# Patient Record
Sex: MALE | Race: WHITE | NOT HISPANIC OR LATINO | Employment: OTHER | URBAN - METROPOLITAN AREA
[De-identification: names, ages, dates, MRNs, and addresses within clinical notes are randomized per-mention and may not be internally consistent; named-entity substitution may affect disease eponyms.]

---

## 2017-04-19 ENCOUNTER — OFFICE VISIT (OUTPATIENT)
Dept: CARDIOLOGY | Facility: MEDICAL CENTER | Age: 82
End: 2017-04-19
Payer: MEDICARE

## 2017-04-19 VITALS
HEART RATE: 89 BPM | BODY MASS INDEX: 23.86 KG/M2 | HEIGHT: 67 IN | OXYGEN SATURATION: 95 % | DIASTOLIC BLOOD PRESSURE: 64 MMHG | SYSTOLIC BLOOD PRESSURE: 130 MMHG | WEIGHT: 152 LBS

## 2017-04-19 DIAGNOSIS — J06.9 VIRAL UPPER RESPIRATORY TRACT INFECTION: ICD-10-CM

## 2017-04-19 DIAGNOSIS — J06.9 URI, ACUTE: ICD-10-CM

## 2017-04-19 PROCEDURE — G8432 DEP SCR NOT DOC, RNG: HCPCS | Performed by: INTERNAL MEDICINE

## 2017-04-19 PROCEDURE — 4040F PNEUMOC VAC/ADMIN/RCVD: CPT | Mod: 8P | Performed by: INTERNAL MEDICINE

## 2017-04-19 PROCEDURE — 1101F PT FALLS ASSESS-DOCD LE1/YR: CPT | Mod: 8P | Performed by: INTERNAL MEDICINE

## 2017-04-19 PROCEDURE — 93280 PM DEVICE PROGR EVAL DUAL: CPT | Performed by: INTERNAL MEDICINE

## 2017-04-19 PROCEDURE — 1036F TOBACCO NON-USER: CPT | Performed by: INTERNAL MEDICINE

## 2017-04-19 PROCEDURE — 99214 OFFICE O/P EST MOD 30 MIN: CPT | Mod: 25 | Performed by: INTERNAL MEDICINE

## 2017-04-19 PROCEDURE — G8420 CALC BMI NORM PARAMETERS: HCPCS | Performed by: INTERNAL MEDICINE

## 2017-04-19 RX ORDER — AZITHROMYCIN 250 MG/1
TABLET, FILM COATED ORAL
Qty: 5 TAB | Refills: 0 | Status: SHIPPED | OUTPATIENT
Start: 2017-04-19 | End: 2019-12-09

## 2017-04-19 NOTE — MR AVS SNAPSHOT
"        Abhilash CAMPOS Rios   2017 8:00 AM   Office Visit   MRN: [id number]    Department:  [facility]   Dept Phone:  [phone]    Description:  Male : 1929   Provider:  Durga Godinez M.D.           Reason for Visit     Follow-Up           Allergies as of 2017     Allergen Noted Reactions    Sulfa Drugs 2009   Anaphylaxis      You were diagnosed with     URI, acute   [999459]       Viral upper respiratory tract infection   [622759]         Vital Signs     Blood Pressure Pulse Height Weight Body Mass Index Oxygen Saturation    130/64 mmHg 89 1.702 m (5' 7.01\") 68.947 kg (152 lb) 23.80 kg/m2 95%    Smoking Status                   Never Smoker            Basic Information     Date Of Birth Sex Race Ethnicity Preferred Language    1929 Male White Non- English      Problem List              ICD-10-CM Priority Class Noted - Resolved    Atrial flutter (CMS-HCC) (Chronic) I48.92   3/8/2012 - Present    Dizziness (Chronic) R42   3/8/2012 - Present    Presence of permanent cardiac pacemaker (Chronic) Z95.0   3/8/2012 - Present    SSS (sick sinus syndrome) (CMS-[facility]) (Chronic) I49.5   3/8/2012 - Present    Vertigo (Chronic) R42   3/8/2012 - Present    Macular degeneration (Chronic) H35.30   3/8/2012 - Present    Pre-syncope (Chronic) R55   3/8/2012 - Present    TIA (transient ischemic attack) (Chronic) G45.9   3/8/2012 - Present    S/P ablation of atrial flutter Z98.890, Z86.79   3/27/2012 - Present    Abdominal hernia K46.9   3/27/2012 - Present    PAF (paroxysmal atrial fibrillation) (CMS-[facility]) I48.0   2013 - Present    Head ache R51   2013 - Present    URI (upper respiratory infection) J06.9   2014 - Present    H/O seasonal allergies Z88.9   2014 - Present    Cough R05   2016 - Present    Viral upper respiratory tract infection J06.9, B97.89   2017 - Present      Health Maintenance        Date Due Completion Dates    IMM DTaP/Tdap/Td Vaccine (1 - Tdap) " 7/24/1948 ---    COLONOSCOPY 7/24/1979 ---    IMM ZOSTER VACCINE 7/24/1989 ---    IMM PNEUMOCOCCAL 65+ (ADULT) LOW/MEDIUM RISK SERIES (1 of 2 - PCV13) 7/24/1994 ---            Current Immunizations     No immunizations on file.      Below and/or attached are the medications your provider expects you to take. Review all of your home medications and newly ordered medications with your provider and/or pharmacist. Follow medication instructions as directed by your provider and/or pharmacist. Please keep your medication list with you and share with your provider. Update the information when medications are discontinued, doses are changed, or new medications (including over-the-counter products) are added; and carry medication information at all times in the event of emergency situations     Allergies:  SULFA DRUGS - Anaphylaxis               Medications  Valid as of: April 19, 2017 -  8:10 AM    Generic Name Brand Name Tablet Size Instructions for use    Aspirin (Tablet Delayed Response) ECOTRIN 81 MG Take 81 mg by mouth every day.        Aspirin (Tab)  MG Take 325 mg by mouth every 6 hours as needed.        Azithromycin (Tab) ZITHROMAX 250 MG z pack take as directed        Cholecalciferol (Cap) Vitamin D 1000 UNIT Take 1 Cap by mouth every day.        .                 Medicines prescribed today were sent to:     [id number] - [place], [place] - [address] [address]    Phone: [phone] Fax: [phone]    Open 24 Hours?: No      Medication refill instructions:       If your prescription bottle indicates you have medication refills left, it is not necessary to call your provider’s office. Please contact your pharmacy and they will refill your medication.    If your prescription bottle indicates you do not have any refills left, you may request refills at any time through one of the following ways: The online [employer] system (except Urgent Care), by calling your provider’s  office, or by asking your pharmacy to contact your provider’s office with a refill request. Medication refills are processed only during regular business hours and may not be available until the next business day. Your provider may request additional information or to have a follow-up visit with you prior to refilling your medication.   *Please Note: Medication refills are assigned a new Rx number when refilled electronically. Your pharmacy may indicate that no refills were authorized even though a new prescription for the same medication is available at the pharmacy. Please request the medicine by name with the pharmacy before contacting your provider for a refill.           "[employer] Access Code: [email or web address]  Expires: 5/19/2017  8:10 AM    "[employer]  A secure, online tool to manage your health information     [employer]’s "[employer]® is a secure, online tool that connects you to your personalized health information from the privacy of your home -- day or night - making it very easy for you to manage your healthcare. Once the activation process is completed, you can even access your medical information using the "[employer] kelsey, which is available for free in the Apple Kelsey store or Google Play store.     "[employer] provides the following levels of access (as shown below):   My Chart Features   [facility] Doctor [facility]  Urgent  Care Non-[facility]  Primary Care  Doctor   Email your healthcare team securely and privately 24/7 X X X    Manage appointments: schedule your next appointment; view details of past/upcoming appointments X      Request prescription refills. X      View recent personal medical records, including lab and immunizations X X X X   View health record, including health history, allergies, medications X X X X   Read reports about your outpatient visits, procedures, consult and ER notes X X X X   See your discharge summary, which is a recap of your hospital and/or ER visit that  includes your diagnosis, lab results, and care plan. X X       How to register for [employer]:  1. Go to  [email or web address].  2. Click on the Sign Up Now box, which takes you to the New Member Sign Up page. You will need to provide the following information:  a. Enter your [employer] Access Code exactly as it appears at the top of this page. (You will not need to use this code after you’ve completed the sign-up process. If you do not sign up before the expiration date, you must request a new code.)   b. Enter your date of birth.   c. Enter your home email address.   d. Click Submit, and follow the next screen’s instructions.  3. Create a [employer] ID. This will be your [employer] login ID and cannot be changed, so think of one that is secure and easy to remember.  4. Create a [employer] password. You can change your password at any time.  5. Enter your Password Reset Question and Answer. This can be used at a later time if you forget your password.   6. Enter your e-mail address. This allows you to receive e-mail notifications when new information is available in [employer].  7. Click Sign Up. You can now view your health information.    For assistance activating your [employer] account, call [phone]

## 2017-04-19 NOTE — PROGRESS NOTES
"Subjective:   Abhilash Rios is a 87 y.o. male who presents today with previous flutter RFA. PPM for SSS. Vertigo. Two month h/o cough non productive and not improving. Mild chronic SOB. No chest pain.     Past Medical History   Diagnosis Date   • Atrial flutter (CMS-HCC) 3/8/2012   • S/P AV amy ablation 3/8/2012   • Dizziness 3/8/2012   • Presence of permanent cardiac pacemaker 3/8/2012   • SSS (sick sinus syndrome) (CMS-[facility]) 3/8/2012   • Vertigo 3/8/2012   • Macular degeneration 3/8/2012   • Pre-syncope 3/8/2012   • TIA (transient ischemic attack) 3/8/2012     Past Surgical History   Procedure Laterality Date   • Eye surgery       09/02/09 Status post eye surgery for macular degeneration.   • Other orthopedic surgery       Lower Back Surgery   • Rotator cuff repair       09/02/09 Bilateral.   • Trans urethral resection prostate       Family History   Problem Relation Age of Onset   • Non-contributory Other      History   Smoking status   • Never Smoker    Smokeless tobacco   • Never Used     Allergies   Allergen Reactions   • Sulfa Drugs Anaphylaxis     Outpatient Encounter Prescriptions as of 4/19/2017   Medication Sig Dispense Refill   • azithromycin (ZITHROMAX) 250 MG Tab z pack take as directed 5 Tab 0   • aspirin (ASA) 325 MG Tab Take 325 mg by mouth every 6 hours as needed.     • Cholecalciferol (VITAMIN D) 1000 UNIT CAPS Take 1 Cap by mouth every day.     • aspirin EC (ECOTRIN) 81 MG Tablet Delayed Response Take 81 mg by mouth every day.       No facility-administered encounter medications on file as of 4/19/2017.     ROS     Objective:   /64 mmHg  Pulse 89  Ht 1.702 m (5' 7.01\")  Wt 68.947 kg (152 lb)  BMI 23.80 kg/m2  SpO2 95%    Physical Exam   Constitutional: He is oriented to person, place, and time. He appears well-developed and well-nourished.   HENT:   Mouth/Throat: Oropharynx is clear and moist.   Eyes: Conjunctivae and EOM are normal.   Neck: No JVD present. No thyroid mass " present.   Cardiovascular: Normal rate, regular rhythm, S1 normal, S2 normal and normal pulses.  PMI is not displaced.  Exam reveals no gallop.    No murmur heard.  Pulses:       Carotid pulses are 2+ on the right side, and 2+ on the left side.       Radial pulses are 2+ on the right side, and 2+ on the left side.        Femoral pulses are 2+ on the right side, and 2+ on the left side.       Dorsalis pedis pulses are 2+ on the right side, and 2+ on the left side.   No peripheral edema.   Pulmonary/Chest: Effort normal and breath sounds normal.   Abdominal: Soft. Normal appearance. He exhibits no abdominal bruit. There is no hepatosplenomegaly. There is no tenderness.   Musculoskeletal: Normal range of motion. He exhibits no edema.        Thoracic back: He exhibits no tenderness and no spasm.   Neurological: He is alert and oriented to person, place, and time.   Skin: No rash noted. No cyanosis. Nails show no clubbing.   Psychiatric: He has a normal mood and affect.       Assessment:     1. URI, acute  azithromycin (ZITHROMAX) 250 MG Tab   2. Viral upper respiratory tract infection         Medical Decision Making:  Today's Assessment / Status / Plan:   1. Flutter post RFA no recurrence.  2. SSS nl PPM function.  3. URI try a zpak. If not improved he will call for a chest xray.  4. Vertigo unchanged.  5. F/U in 6 months.

## 2017-04-19 NOTE — Clinical Note
"     [facility]   [address], [address]  [place] [address]  Phone: [phone]  Fax: [phone]              Abhilash Rios  7/24/1929    Encounter Date: 4/19/2017    Durga Godinez M.D.          PROGRESS NOTE:  Subjective:   Abhilash Rios is a 87 y.o. male who presents today with previous flutter RFA. PPM for SSS. Vertigo. Two month h/o cough non productive and not improving. Mild chronic SOB. No chest pain.     Past Medical History   Diagnosis Date   • Atrial flutter (CMS-[facility]) 3/8/2012   • S/P AV amy ablation 3/8/2012   • Dizziness 3/8/2012   • Presence of permanent cardiac pacemaker 3/8/2012   • SSS (sick sinus syndrome) (CMS-[facility]) 3/8/2012   • Vertigo 3/8/2012   • Macular degeneration 3/8/2012   • Pre-syncope 3/8/2012   • TIA (transient ischemic attack) 3/8/2012     Past Surgical History   Procedure Laterality Date   • Eye surgery       09/02/09 Status post eye surgery for macular degeneration.   • Other orthopedic surgery       Lower Back Surgery   • Rotator cuff repair       09/02/09 Bilateral.   • Trans urethral resection prostate       Family History   Problem Relation Age of Onset   • Non-contributory Other      History   Smoking status   • Never Smoker    Smokeless tobacco   • Never Used     Allergies   Allergen Reactions   • Sulfa Drugs Anaphylaxis     Outpatient Encounter Prescriptions as of 4/19/2017   Medication Sig Dispense Refill   • azithromycin (ZITHROMAX) 250 MG Tab z pack take as directed 5 Tab 0   • aspirin (ASA) 325 MG Tab Take 325 mg by mouth every 6 hours as needed.     • Cholecalciferol (VITAMIN D) 1000 UNIT CAPS Take 1 Cap by mouth every day.     • aspirin EC (ECOTRIN) 81 MG Tablet Delayed Response Take 81 mg by mouth every day.       No facility-administered encounter medications on file as of 4/19/2017.     ROS     Objective:   /64 mmHg  Pulse 89  Ht 1.702 m (5' 7.01\")  Wt 68.947 kg (152 lb)  BMI 23.80 kg/m2  SpO2 " 95%    Physical Exam   Constitutional: He is oriented to person, place, and time. He appears well-developed and well-nourished.   HENT:   Mouth/Throat: Oropharynx is clear and moist.   Eyes: Conjunctivae and EOM are normal.   Neck: No JVD present. No thyroid mass present.   Cardiovascular: Normal rate, regular rhythm, S1 normal, S2 normal and normal pulses.  PMI is not displaced.  Exam reveals no gallop.    No murmur heard.  Pulses:       Carotid pulses are 2+ on the right side, and 2+ on the left side.       Radial pulses are 2+ on the right side, and 2+ on the left side.        Femoral pulses are 2+ on the right side, and 2+ on the left side.       Dorsalis pedis pulses are 2+ on the right side, and 2+ on the left side.   No peripheral edema.   Pulmonary/Chest: Effort normal and breath sounds normal.   Abdominal: Soft. Normal appearance. He exhibits no abdominal bruit. There is no hepatosplenomegaly. There is no tenderness.   Musculoskeletal: Normal range of motion. He exhibits no edema.        Thoracic back: He exhibits no tenderness and no spasm.   Neurological: He is alert and oriented to person, place, and time.   Skin: No rash noted. No cyanosis. Nails show no clubbing.   Psychiatric: He has a normal mood and affect.       Assessment:     1. URI, acute  azithromycin (ZITHROMAX) 250 MG Tab   2. Viral upper respiratory tract infection         Medical Decision Making:  Today's Assessment / Status / Plan:   1. Flutter post RFA no recurrence.  2. SSS nl PPM function.  3. URI try a zpak. If not improved he will call for a chest xray.  4. Vertigo unchanged.  5. F/U in 6 months.        James Avila M.D.  [address] [place] [id number]  VIA Facsimile: [phone]

## 2017-10-23 ENCOUNTER — OFFICE VISIT (OUTPATIENT)
Dept: CARDIOLOGY | Facility: MEDICAL CENTER | Age: 82
End: 2017-10-23
Payer: MEDICARE

## 2017-10-23 VITALS
DIASTOLIC BLOOD PRESSURE: 70 MMHG | HEART RATE: 74 BPM | BODY MASS INDEX: 23.86 KG/M2 | OXYGEN SATURATION: 96 % | SYSTOLIC BLOOD PRESSURE: 100 MMHG | HEIGHT: 67 IN | WEIGHT: 152 LBS

## 2017-10-23 DIAGNOSIS — Z95.0 PRESENCE OF PERMANENT CARDIAC PACEMAKER: Chronic | ICD-10-CM

## 2017-10-23 DIAGNOSIS — I49.5 SSS (SICK SINUS SYNDROME) (HCC): Chronic | ICD-10-CM

## 2017-10-23 DIAGNOSIS — I48.3 TYPICAL ATRIAL FLUTTER (HCC): Chronic | ICD-10-CM

## 2017-10-23 DIAGNOSIS — G45.8 OTHER SPECIFIED TRANSIENT CEREBRAL ISCHEMIAS: Chronic | ICD-10-CM

## 2017-10-23 DIAGNOSIS — Z98.890 S/P ABLATION OF ATRIAL FLUTTER: ICD-10-CM

## 2017-10-23 DIAGNOSIS — Z86.79 S/P ABLATION OF ATRIAL FLUTTER: ICD-10-CM

## 2017-10-23 PROBLEM — J06.9 VIRAL UPPER RESPIRATORY TRACT INFECTION: Status: RESOLVED | Noted: 2017-04-19 | Resolved: 2017-10-23

## 2017-10-23 PROCEDURE — 99214 OFFICE O/P EST MOD 30 MIN: CPT | Mod: 25 | Performed by: INTERNAL MEDICINE

## 2017-10-23 PROCEDURE — 93280 PM DEVICE PROGR EVAL DUAL: CPT | Performed by: INTERNAL MEDICINE

## 2017-10-23 NOTE — LETTER
[facility]   [address], [address]  [place] [address]  Phone: [phone]  Fax: [phone]              Abhilash Rios  7/24/1929    Encounter Date: 10/23/2017    Durga Godinez M.D.          PROGRESS NOTE:  Subjective:   Abhilash Rios is a 88 y.o. male who presents today with previous flutter and RFA. PPM for SSS. Meets criteria for anticoagulation but he does not want. No new complaints. No syncope or presyncope. No chest pain. Stable. No palps.     Past Medical History:   Diagnosis Date   • Atrial flutter (CMS-HCC) 3/8/2012   • Dizziness 3/8/2012   • Macular degeneration 3/8/2012   • Pre-syncope 3/8/2012   • Presence of permanent cardiac pacemaker 3/8/2012   • S/P AV amy ablation 3/8/2012   • SSS (sick sinus syndrome) (CMS-HCC) 3/8/2012   • TIA (transient ischemic attack) 3/8/2012   • Vertigo 3/8/2012     Past Surgical History:   Procedure Laterality Date   • EYE SURGERY      09/02/09 Status post eye surgery for macular degeneration.   • OTHER ORTHOPEDIC SURGERY      Lower Back Surgery   • ROTATOR CUFF REPAIR      09/02/09 Bilateral.   • TRANS URETHRAL RESECTION PROSTATE       Family History   Problem Relation Age of Onset   • Non-contributory Other      History   Smoking Status   • Never Smoker   Smokeless Tobacco   • Never Used     Allergies   Allergen Reactions   • Sulfa Drugs Anaphylaxis     Outpatient Encounter Prescriptions as of 10/23/2017   Medication Sig Dispense Refill   • aspirin (ASA) 325 MG Tab Take 325 mg by mouth every 6 hours as needed.     • Cholecalciferol (VITAMIN D) 1000 UNIT CAPS Take 1 Cap by mouth every day.     • azithromycin (ZITHROMAX) 250 MG Tab z pack take as directed (Patient not taking: Reported on 10/23/2017) 5 Tab 0   • aspirin EC (ECOTRIN) 81 MG Tablet Delayed Response Take 81 mg by mouth every day.       No facility-administered encounter medications on file as of 10/23/2017.      ROS     Objective:   /70    "Pulse 74   Ht 1.702 m (5' 7.01\")   Wt 68.9 kg (152 lb)   SpO2 96%   BMI 23.80 kg/m²      Physical Exam   Constitutional: He is oriented to person, place, and time. He appears well-developed. No distress.   HENT:   Mouth/Throat: Mucous membranes are normal.   Eyes: Conjunctivae and EOM are normal.   Neck: No JVD present. No tracheal deviation present. No thyroid mass and no thyromegaly present.   Cardiovascular: Normal rate, regular rhythm and intact distal pulses.    No murmur heard.  Pulmonary/Chest: Effort normal and breath sounds normal. No respiratory distress. He exhibits no tenderness.   Abdominal: Soft. There is no tenderness.   Musculoskeletal: Normal range of motion. He exhibits no edema.   Neurological: He is alert and oriented to person, place, and time. He has normal strength. He displays no tremor.   Skin: Skin is warm and dry. He is not diaphoretic.   Psychiatric: He has a normal mood and affect. His behavior is normal.   Vitals reviewed.      Assessment:     1. Typical atrial flutter (CMS-HCC)     2. Other specified transient cerebral ischemias     3. SSS (sick sinus syndrome) (CMS-HCC)     4. S/P ablation of atrial flutter     5. Presence of permanent cardiac pacemaker         Medical Decision Making:  Today's Assessment / Status / Plan:   1. SSS with PPM stable.  2. Atrial flutter post RFA. No recurrence.  3. PAF as above.  4. F/U in 6 months.      James Avila M.D.  [address] [place] [address]  VIA Facsimile: [phone]                 "

## 2018-05-15 ENCOUNTER — OFFICE VISIT (OUTPATIENT)
Dept: CARDIOLOGY | Facility: MEDICAL CENTER | Age: 83
End: 2018-05-15
Payer: MEDICARE

## 2018-05-15 VITALS
HEIGHT: 67 IN | SYSTOLIC BLOOD PRESSURE: 140 MMHG | BODY MASS INDEX: 25.27 KG/M2 | OXYGEN SATURATION: 100 % | HEART RATE: 87 BPM | WEIGHT: 161 LBS | DIASTOLIC BLOOD PRESSURE: 82 MMHG

## 2018-05-15 DIAGNOSIS — I48.3 TYPICAL ATRIAL FLUTTER (HCC): Chronic | ICD-10-CM

## 2018-05-15 DIAGNOSIS — Z98.890 S/P ABLATION OF ATRIAL FLUTTER: ICD-10-CM

## 2018-05-15 DIAGNOSIS — R42 VERTIGO: Chronic | ICD-10-CM

## 2018-05-15 DIAGNOSIS — I49.5 SSS (SICK SINUS SYNDROME) (HCC): Chronic | ICD-10-CM

## 2018-05-15 DIAGNOSIS — Z95.0 PRESENCE OF PERMANENT CARDIAC PACEMAKER: Chronic | ICD-10-CM

## 2018-05-15 DIAGNOSIS — Z86.79 S/P ABLATION OF ATRIAL FLUTTER: ICD-10-CM

## 2018-05-15 PROCEDURE — 93280 PM DEVICE PROGR EVAL DUAL: CPT | Performed by: INTERNAL MEDICINE

## 2018-05-15 PROCEDURE — 99213 OFFICE O/P EST LOW 20 MIN: CPT | Performed by: INTERNAL MEDICINE

## 2018-05-15 NOTE — LETTER
[facility]   [address]  [place] [address]  Phone: [phone]  Fax: [phone]              Abhilash Rios  7/24/1929    Encounter Date: 5/15/2018    Durga Godinez M.D.          PROGRESS NOTE:  Chief Complaint   Patient presents with   • Atrial Flutter     F/V DX:ATRIAL FLUTTER       Subjective:   Abhilash Rios is a 88 y.o. male who presents today with SSS and PPM. Doing well. No chest pain or SOB. Minimal atrial arrhythmias. On ASA per patients request. Overall stable.    Past Medical History:   Diagnosis Date   • Atrial flutter (HCC) 3/8/2012   • Dizziness 3/8/2012   • Macular degeneration 3/8/2012   • Pre-syncope 3/8/2012   • Presence of permanent cardiac pacemaker 3/8/2012   • S/P AV amy ablation 3/8/2012   • SSS (sick sinus syndrome) (HCC) 3/8/2012   • TIA (transient ischemic attack) 3/8/2012   • Vertigo 3/8/2012     Past Surgical History:   Procedure Laterality Date   • EYE SURGERY      09/02/09 Status post eye surgery for macular degeneration.   • OTHER ORTHOPEDIC SURGERY      Lower Back Surgery   • ROTATOR CUFF REPAIR      09/02/09 Bilateral.   • TRANS URETHRAL RESECTION PROSTATE       Family History   Problem Relation Age of Onset   • Non-contributory Other      Social History     Social History   • Marital status: Single     Spouse name: N/A   • Number of children: N/A   • Years of education: N/A     Occupational History   • Not on file.     Social History Main Topics   • Smoking status: Never Smoker   • Smokeless tobacco: Never Used   • Alcohol use Not on file   • Drug use: Unknown   • Sexual activity: Not on file     Other Topics Concern   • Not on file     Social History Narrative   • No narrative on file     Allergies   Allergen Reactions   • Sulfa Drugs Anaphylaxis     Outpatient Encounter Prescriptions as of 5/15/2018   Medication Sig Dispense Refill   • aspirin (ASA) 325 MG Tab Take 325 mg by mouth every 6 hours as needed.    "  • Cholecalciferol (VITAMIN D) 1000 UNIT CAPS Take 1 Cap by mouth every day.     • azithromycin (ZITHROMAX) 250 MG Tab z pack take as directed (Patient not taking: Reported on 10/23/2017) 5 Tab 0   • aspirin EC (ECOTRIN) 81 MG Tablet Delayed Response Take 81 mg by mouth every day.       No facility-administered encounter medications on file as of 5/15/2018.      ROS     Objective:   /82   Pulse 87   Ht 1.702 m (5' 7.01\")   Wt 73 kg (161 lb)   SpO2 100%   BMI 25.21 kg/m²      Physical Exam   Constitutional: He is oriented to person, place, and time. He appears well-developed and well-nourished.   HENT:   Head: Normocephalic and atraumatic.   Eyes: EOM are normal.   Neck: Normal range of motion. Neck supple.   Cardiovascular: Normal rate, regular rhythm and intact distal pulses.  Exam reveals no gallop and no friction rub.    No murmur heard.  Pulmonary/Chest: Effort normal and breath sounds normal.   Abdominal: Soft.   Musculoskeletal: Normal range of motion. He exhibits no edema.   Neurological: He is alert and oriented to person, place, and time.   Skin: Skin is warm and dry.   Psychiatric: He has a normal mood and affect. His behavior is normal. Judgment and thought content normal.       Assessment:     1. Presence of permanent cardiac pacemaker     2. Typical atrial flutter (HCC)     3. S/P ablation of atrial flutter     4. Vertigo     5. SSS (sick sinus syndrome) (HCC)         Medical Decision Making:  Today's Assessment / Status / Plan:   1. SSS nl PPM function.  2. PAF minimal.  3. Vertigo unchanged.  4. F/U device check in 6 months and me in 1 year.      James Avila M.D.  [address] [place] [address]  VIA Facsimile: [phone]                 "

## 2019-05-22 ENCOUNTER — OFFICE VISIT (OUTPATIENT)
Dept: CARDIOLOGY | Facility: MEDICAL CENTER | Age: 84
End: 2019-05-22
Payer: COMMERCIAL

## 2019-05-22 VITALS
BODY MASS INDEX: 21.66 KG/M2 | OXYGEN SATURATION: 97 % | SYSTOLIC BLOOD PRESSURE: 144 MMHG | HEART RATE: 98 BPM | DIASTOLIC BLOOD PRESSURE: 92 MMHG | WEIGHT: 138 LBS | HEIGHT: 67 IN

## 2019-05-22 DIAGNOSIS — Z86.79 S/P ABLATION OF ATRIAL FLUTTER: ICD-10-CM

## 2019-05-22 DIAGNOSIS — I48.0 PAF (PAROXYSMAL ATRIAL FIBRILLATION) (HCC): ICD-10-CM

## 2019-05-22 DIAGNOSIS — Z98.890 S/P ABLATION OF ATRIAL FLUTTER: ICD-10-CM

## 2019-05-22 DIAGNOSIS — I48.3 TYPICAL ATRIAL FLUTTER (HCC): Chronic | ICD-10-CM

## 2019-05-22 DIAGNOSIS — I49.5 SSS (SICK SINUS SYNDROME) (HCC): Chronic | ICD-10-CM

## 2019-05-22 DIAGNOSIS — Z95.0 PRESENCE OF PERMANENT CARDIAC PACEMAKER: Chronic | ICD-10-CM

## 2019-05-22 PROCEDURE — 93280 PM DEVICE PROGR EVAL DUAL: CPT | Performed by: INTERNAL MEDICINE

## 2019-05-22 PROCEDURE — 99214 OFFICE O/P EST MOD 30 MIN: CPT | Mod: 25 | Performed by: INTERNAL MEDICINE

## 2019-05-22 NOTE — PROGRESS NOTES
Chief Complaint   Patient presents with   • Atrial Flutter     FV       Subjective:   Abhilash Rios is a 89 y.o. male who presents today with previous atrial flutter status post ablation status post permanent pacemaker for sick sinus syndrome no real atrial fibrillation.  Patient has not wanted to be on anticoagulation.  Denies any chest pain or shortness of breath recently of the flu.  Today is accompanied by his brother.    Past Medical History:   Diagnosis Date   • Atrial flutter (HCC) 3/8/2012   • Dizziness 3/8/2012   • Macular degeneration 3/8/2012   • Pre-syncope 3/8/2012   • Presence of permanent cardiac pacemaker 3/8/2012   • S/P AV amy ablation 3/8/2012   • SSS (sick sinus syndrome) (HCC) 3/8/2012   • TIA (transient ischemic attack) 3/8/2012   • Vertigo 3/8/2012     Past Surgical History:   Procedure Laterality Date   • EYE SURGERY      09/02/09 Status post eye surgery for macular degeneration.   • OTHER ORTHOPEDIC SURGERY      Lower Back Surgery   • ROTATOR CUFF REPAIR      09/02/09 Bilateral.   • TRANS URETHRAL RESECTION PROSTATE       Family History   Problem Relation Age of Onset   • Non-contributory Other      Social History     Social History   • Marital status: Single     Spouse name: N/A   • Number of children: N/A   • Years of education: N/A     Occupational History   • Not on file.     Social History Main Topics   • Smoking status: Never Smoker   • Smokeless tobacco: Never Used   • Alcohol use No   • Drug use: No   • Sexual activity: Not on file     Other Topics Concern   • Not on file     Social History Narrative   • No narrative on file     Allergies   Allergen Reactions   • Sulfa Drugs Anaphylaxis     Outpatient Encounter Prescriptions as of 5/22/2019   Medication Sig Dispense Refill   • aspirin (ASA) 325 MG Tab Take 325 mg by mouth every 6 hours as needed.     • Cholecalciferol (VITAMIN D) 1000 UNIT CAPS Take 1 Cap by mouth every day.     • azithromycin (ZITHROMAX) 250 MG Tab z pack take  "as directed (Patient not taking: Reported on 10/23/2017) 5 Tab 0   • aspirin EC (ECOTRIN) 81 MG Tablet Delayed Response Take 81 mg by mouth every day.       No facility-administered encounter medications on file as of 5/22/2019.      ROS     Objective:   /92 (BP Location: Left arm, Patient Position: Sitting, BP Cuff Size: Adult)   Pulse 98   Ht 1.702 m (5' 7.01\")   Wt 62.6 kg (138 lb)   SpO2 97%   BMI 21.61 kg/m²     Physical Exam   Constitutional: He is oriented to person, place, and time. He appears well-developed and well-nourished.   HENT:   Head: Normocephalic and atraumatic.   Eyes: EOM are normal.   Neck: Normal range of motion. Neck supple.   Cardiovascular: Normal rate, regular rhythm and intact distal pulses.  Exam reveals no gallop and no friction rub.    No murmur heard.  Pulmonary/Chest: Effort normal and breath sounds normal.   Abdominal: Soft.   Musculoskeletal: Normal range of motion. He exhibits no edema.   Neurological: He is alert and oriented to person, place, and time.   Skin: Skin is warm and dry.   Psychiatric: He has a normal mood and affect. His behavior is normal. Judgment and thought content normal.       Assessment:     1. S/P ablation of atrial flutter     2. PAF (paroxysmal atrial fibrillation) ([facility])     3. Typical atrial flutter ([facility])     4. SSS (sick sinus syndrome) ([facility])     5. Presence of permanent cardiac pacemaker         Medical Decision Making:  Today's Assessment / Status / Plan:   1.  Sick sinus syndrome permanent pacemaker normal function nearing DEMARCO recheck in 6 months.  2.  Paroxysmal atrial fibrillation none seen.  3.  Atrial flutter status post ablation.    "

## 2019-05-22 NOTE — LETTER
[facility]   [address], [address]  [place] [address]  Phone: [phone]  Fax: [phone]              Abhilash Rios  7/24/1929    Encounter Date: 5/22/2019    Durga Godinez M.D.          PROGRESS NOTE:  Chief Complaint   Patient presents with   • Atrial Flutter     FV       Subjective:   Abhilash Rios is a 89 y.o. male who presents today with previous atrial flutter status post ablation status post permanent pacemaker for sick sinus syndrome no real atrial fibrillation.  Patient has not wanted to be on anticoagulation.  Denies any chest pain or shortness of breath recently of the flu.  Today is accompanied by his brother.    Past Medical History:   Diagnosis Date   • Atrial flutter (HCC) 3/8/2012   • Dizziness 3/8/2012   • Macular degeneration 3/8/2012   • Pre-syncope 3/8/2012   • Presence of permanent cardiac pacemaker 3/8/2012   • S/P AV amy ablation 3/8/2012   • SSS (sick sinus syndrome) (HCC) 3/8/2012   • TIA (transient ischemic attack) 3/8/2012   • Vertigo 3/8/2012     Past Surgical History:   Procedure Laterality Date   • EYE SURGERY      09/02/09 Status post eye surgery for macular degeneration.   • OTHER ORTHOPEDIC SURGERY      Lower Back Surgery   • ROTATOR CUFF REPAIR      09/02/09 Bilateral.   • TRANS URETHRAL RESECTION PROSTATE       Family History   Problem Relation Age of Onset   • Non-contributory Other      Social History     Social History   • Marital status: Single     Spouse name: N/A   • Number of children: N/A   • Years of education: N/A     Occupational History   • Not on file.     Social History Main Topics   • Smoking status: Never Smoker   • Smokeless tobacco: Never Used   • Alcohol use No   • Drug use: No   • Sexual activity: Not on file     Other Topics Concern   • Not on file     Social History Narrative   • No narrative on file     Allergies   Allergen Reactions   • Sulfa Drugs Anaphylaxis     Outpatient Encounter  "Prescriptions as of 5/22/2019   Medication Sig Dispense Refill   • aspirin (ASA) 325 MG Tab Take 325 mg by mouth every 6 hours as needed.     • Cholecalciferol (VITAMIN D) 1000 UNIT CAPS Take 1 Cap by mouth every day.     • azithromycin (ZITHROMAX) 250 MG Tab z pack take as directed (Patient not taking: Reported on 10/23/2017) 5 Tab 0   • aspirin EC (ECOTRIN) 81 MG Tablet Delayed Response Take 81 mg by mouth every day.       No facility-administered encounter medications on file as of 5/22/2019.      ROS     Objective:   /92 (BP Location: Left arm, Patient Position: Sitting, BP Cuff Size: Adult)   Pulse 98   Ht 1.702 m (5' 7.01\")   Wt 62.6 kg (138 lb)   SpO2 97%   BMI 21.61 kg/m²      Physical Exam   Constitutional: He is oriented to person, place, and time. He appears well-developed and well-nourished.   HENT:   Head: Normocephalic and atraumatic.   Eyes: EOM are normal.   Neck: Normal range of motion. Neck supple.   Cardiovascular: Normal rate, regular rhythm and intact distal pulses.  Exam reveals no gallop and no friction rub.    No murmur heard.  Pulmonary/Chest: Effort normal and breath sounds normal.   Abdominal: Soft.   Musculoskeletal: Normal range of motion. He exhibits no edema.   Neurological: He is alert and oriented to person, place, and time.   Skin: Skin is warm and dry.   Psychiatric: He has a normal mood and affect. His behavior is normal. Judgment and thought content normal.       Assessment:     1. S/P ablation of atrial flutter     2. PAF (paroxysmal atrial fibrillation) ([facility])     3. Typical atrial flutter ([facility])     4. SSS (sick sinus syndrome) ([facility])     5. Presence of permanent cardiac pacemaker         Medical Decision Making:  Today's Assessment / Status / Plan:   1.  Sick sinus syndrome permanent pacemaker normal function nearing DEMARCO recheck in 6 months.  2.  Paroxysmal atrial fibrillation none seen.  3.  Atrial flutter status post ablation.        James Avila M.D.  [address] [place] [id number]  VIA Facsimile: [phone]

## 2019-11-20 ENCOUNTER — NON-PROVIDER VISIT (OUTPATIENT)
Dept: CARDIOLOGY | Facility: MEDICAL CENTER | Age: 84
End: 2019-11-20
Payer: COMMERCIAL

## 2019-11-20 ENCOUNTER — TELEPHONE (OUTPATIENT)
Dept: CARDIOLOGY | Facility: MEDICAL CENTER | Age: 84
End: 2019-11-20

## 2019-11-20 DIAGNOSIS — I49.5 SSS (SICK SINUS SYNDROME) (HCC): Chronic | ICD-10-CM

## 2019-11-20 DIAGNOSIS — Z95.0 PRESENCE OF PERMANENT CARDIAC PACEMAKER: Chronic | ICD-10-CM

## 2019-11-20 PROCEDURE — 93279 PRGRMG DEV EVAL PM/LDLS PM: CPT | Performed by: INTERNAL MEDICINE

## 2019-11-20 NOTE — TELEPHONE ENCOUNTER
Patient seen in device clinic--functioning appropriately.  Patient in atrial fib/atrial flutter since 6/30--confirms he is taking 325 aspirin.  Patient states that fatigue and SOB has steadily increased.

## 2019-11-20 NOTE — TELEPHONE ENCOUNTER
Should start eliquis 2.5 mg bid and dc asa and f/u with us in next few weeks. He may refuse anticoagulation

## 2019-11-20 NOTE — TELEPHONE ENCOUNTER
Spoke to pt and advised. He verbalized understanding to of all and to stop ASA when starts new med, appt 12/9 DS. rx sent

## 2019-12-09 ENCOUNTER — OFFICE VISIT (OUTPATIENT)
Dept: CARDIOLOGY | Facility: MEDICAL CENTER | Age: 84
End: 2019-12-09
Payer: COMMERCIAL

## 2019-12-09 ENCOUNTER — TELEPHONE (OUTPATIENT)
Dept: CARDIOLOGY | Facility: MEDICAL CENTER | Age: 84
End: 2019-12-09

## 2019-12-09 VITALS
HEIGHT: 67 IN | HEART RATE: 89 BPM | BODY MASS INDEX: 21.82 KG/M2 | OXYGEN SATURATION: 96 % | DIASTOLIC BLOOD PRESSURE: 74 MMHG | SYSTOLIC BLOOD PRESSURE: 124 MMHG | WEIGHT: 139 LBS

## 2019-12-09 DIAGNOSIS — Z98.890 S/P ABLATION OF ATRIAL FLUTTER: ICD-10-CM

## 2019-12-09 DIAGNOSIS — G45.9 TIA (TRANSIENT ISCHEMIC ATTACK): Chronic | ICD-10-CM

## 2019-12-09 DIAGNOSIS — Z95.0 PRESENCE OF PERMANENT CARDIAC PACEMAKER: Chronic | ICD-10-CM

## 2019-12-09 DIAGNOSIS — I48.0 PAF (PAROXYSMAL ATRIAL FIBRILLATION) (HCC): ICD-10-CM

## 2019-12-09 DIAGNOSIS — I48.4 ATYPICAL ATRIAL FLUTTER (HCC): ICD-10-CM

## 2019-12-09 DIAGNOSIS — Z86.79 S/P ABLATION OF ATRIAL FLUTTER: ICD-10-CM

## 2019-12-09 DIAGNOSIS — Z45.010 ELECTIVE REPLACEMENT INDICATED FOR CARDIAC PACEMAKER BATTERY AT END OF LIFESPAN: ICD-10-CM

## 2019-12-09 DIAGNOSIS — I49.5 SSS (SICK SINUS SYNDROME) (HCC): Chronic | ICD-10-CM

## 2019-12-09 LAB — EKG IMPRESSION: NORMAL

## 2019-12-09 PROCEDURE — 93000 ELECTROCARDIOGRAM COMPLETE: CPT | Mod: 59 | Performed by: INTERNAL MEDICINE

## 2019-12-09 PROCEDURE — 93279 PRGRMG DEV EVAL PM/LDLS PM: CPT | Performed by: INTERNAL MEDICINE

## 2019-12-09 PROCEDURE — 99215 OFFICE O/P EST HI 40 MIN: CPT | Mod: 25 | Performed by: INTERNAL MEDICINE

## 2019-12-09 RX ORDER — ASPIRIN 325 MG
325 TABLET ORAL
COMMUNITY
End: 2019-12-09

## 2019-12-09 NOTE — TELEPHONE ENCOUNTER
Patient scheduled for flutter ablation w/SARY and PM gen change on 1-17-20 at [facility] with Dr. Godinez.

## 2019-12-09 NOTE — PROGRESS NOTES
Chief Complaint   Patient presents with   • Atrial Flutter     F/V DX:ATRIAL FLUTTER       Subjective:   Abhilash Rios is a 89 y.o. male who presents today with history of atrial flutter status post ablation at [place]'s 2010 .  Now with recurrence of atrial flutter versus atypical atrial flutter.  Mild shortness of breath.  Device at near DEMARCO.  Does not like being on Eliquis.  Still quite active at age 89 accompanied by his brother.    Past Medical History:   Diagnosis Date   • Atrial flutter (HCC) 3/8/2012   • Dizziness 3/8/2012   • Macular degeneration 3/8/2012   • Pre-syncope 3/8/2012   • Presence of permanent cardiac pacemaker 3/8/2012   • S/P AV amy ablation 3/8/2012   • SSS (sick sinus syndrome) (HCC) 3/8/2012   • TIA (transient ischemic attack) 3/8/2012   • Vertigo 3/8/2012     Past Surgical History:   Procedure Laterality Date   • EYE SURGERY      09/02/09 Status post eye surgery for macular degeneration.   • OTHER ORTHOPEDIC SURGERY      Lower Back Surgery   • ROTATOR CUFF REPAIR      09/02/09 Bilateral.   • TRANS URETHRAL RESECTION PROSTATE       Family History   Problem Relation Age of Onset   • Non-contributory Other      Social History     Socioeconomic History   • Marital status: Single     Spouse name: Not on file   • Number of children: Not on file   • Years of education: Not on file   • Highest education level: Not on file   Occupational History   • Not on file   Social Needs   • Financial resource strain: Not on file   • Food insecurity:     Worry: Not on file     Inability: Not on file   • Transportation needs:     Medical: Not on file     Non-medical: Not on file   Tobacco Use   • Smoking status: Never Smoker   • Smokeless tobacco: Never Used   Substance and Sexual Activity   • Alcohol use: No   • Drug use: No   • Sexual activity: Not on file   Lifestyle   • Physical activity:     Days per week: Not on file     Minutes per session: Not on file   • Stress: Not on file   Relationships   •  "Social connections:     Talks on phone: Not on file     Gets together: Not on file     Attends [employer] service: Not on file     Active member of club or organization: Not on file     Attends meetings of clubs or organizations: Not on file     Relationship status: Not on file   • Intimate partner violence:     Fear of current or ex partner: Not on file     Emotionally abused: Not on file     Physically abused: Not on file     Forced sexual activity: Not on file   Other Topics Concern   • Not on file   Social History Narrative   • Not on file     Allergies   Allergen Reactions   • Sulfa Drugs Anaphylaxis     Outpatient Encounter Medications as of 12/9/2019   Medication Sig Dispense Refill   • Cholecalciferol (D3-1000) 1000 UNIT Cap Take 1 Cap by mouth.     • apixaban (ELIQUIS) 2.5mg Tab Take 1 Tab by mouth 2 Times a Day. 180 Tab 11   • [DISCONTINUED] aspirin (GOODSENSE ASPIRIN) 325 MG Tab Take 325 mg by mouth.     • [DISCONTINUED] apixaban (ELIQUIS) 2.5mg Tab Take 1 Tab by mouth 2 Times a Day. 60 Tab 11   • [DISCONTINUED] azithromycin (ZITHROMAX) 250 MG Tab z pack take as directed (Patient not taking: Reported on 10/23/2017) 5 Tab 0   • [DISCONTINUED] Cholecalciferol (VITAMIN D) 1000 UNIT CAPS Take 1 Cap by mouth every day.       No facility-administered encounter medications on file as of 12/9/2019.      ROS     Objective:   /74 (BP Location: Left arm, Patient Position: Sitting, BP Cuff Size: Adult)   Pulse 89   Ht 1.702 m (5' 7.01\")   Wt 63 kg (139 lb)   SpO2 96%   BMI 21.76 kg/m²     Physical Exam   Constitutional: He is oriented to person, place, and time. He appears well-developed and well-nourished.   HENT:   Head: Normocephalic and atraumatic.   Eyes: EOM are normal.   Neck: Normal range of motion. Neck supple.   Cardiovascular: Normal rate and intact distal pulses. A regularly irregular rhythm present. Exam reveals no gallop and no friction rub.   No murmur heard.  Pulmonary/Chest: Effort normal " and breath sounds normal.   Abdominal: Soft.   Musculoskeletal: Normal range of motion.         General: No edema.   Neurological: He is alert and oriented to person, place, and time.   Skin: Skin is warm and dry.   Psychiatric: He has a normal mood and affect. His behavior is normal. Judgment and thought content normal.       Assessment:     1. Atypical atrial flutter (HCC)  EKG   2. SSS (sick sinus syndrome) ([facility])     3. S/P ablation of atrial flutter     4. Presence of permanent cardiac pacemaker     5. PAF (paroxysmal atrial fibrillation) ([facility])     6. TIA (transient ischemic attack)         Medical Decision Making:  Today's Assessment / Status / Plan:   1.  Atrial flutter.  Recurrent discussed options just staying on anticoagulation versus catheter ablation.  Patient wishes to go ahead with cath ablation.  If left-sided,  cardioversion.  2.  Permanent pacemaker at near DEMARCO schedule generator change.  3.  Previous TIA.  The risks, benefits, and alternatives to atrial flutter ablation were discussed in great detail, specific risks mentioned including bleeding, infection, arteriovenous fistula related to  sheath placement, cardiac perforation with possible tamponade requiring pericardiocentesis or possibly open heart surgery. In addition, pneumothorax and hemothorax were mentioned with right internal jugular venous access. I also discussed the  possibility of permanent pacemaker placement due to to inadvertent AV block. In addition the risk of death, stroke and myocardial infarction were discussed. Success rate for typical atrial flutter at approximately 90%. Total complication rate estimated to be approximately 1%.  The patient verbalized understanding of these potential complications and wishes to proceed with this procedure.   The risks, benefits, and alternatives to transesophageal echocardiogram with IV sedation were discussed with the patient in specific detail, including oropharyngeal and esophageal traumas  including hoarseness and dysphagia after the procedure. Rare cases demonstrating serious or fatal complications associated with transesophageal echocardiogram have been reported in the adult population, including cardiac, pulmonary and bleeding complications in less than 1% of people. Patients with an identified intracardiac thrombus are at increased risk for embolic events and this appears to be reduced with anticoagulant therapy. The patient verbalized understandings about these  possible complications and wishes to proceed with this procedure  The risks, benefits, and alternatives to permanent pacemaker replacement were discussed in great detail.  Specific risks mentioned to the patient including bleeding, cardiac perforation with possible tamponade possibly requiring pericardiocentesis or open heart surgery.  In addition the possibility of lead dislodgment (2-3%), pneumothorax (3%), hemothorax, infection were discussed.  Also, mentioned were the risk of death, stroke, and myocardial infarction.  The patient verbalized understanding of the potential complications and wishes to proceed with the procedure.

## 2020-01-17 ENCOUNTER — APPOINTMENT (OUTPATIENT)
Dept: CARDIOLOGY | Facility: MEDICAL CENTER | Age: 85
End: 2020-01-17
Attending: INTERNAL MEDICINE
Payer: COMMERCIAL

## 2020-01-17 ENCOUNTER — ANESTHESIA EVENT (OUTPATIENT)
Dept: CARDIOLOGY | Facility: MEDICAL CENTER | Age: 85
End: 2020-01-17
Payer: COMMERCIAL

## 2020-01-17 ENCOUNTER — HOSPITAL ENCOUNTER (OUTPATIENT)
Facility: MEDICAL CENTER | Age: 85
End: 2020-01-17
Attending: INTERNAL MEDICINE | Admitting: INTERNAL MEDICINE
Payer: COMMERCIAL

## 2020-01-17 ENCOUNTER — ANESTHESIA (OUTPATIENT)
Dept: CARDIOLOGY | Facility: MEDICAL CENTER | Age: 85
End: 2020-01-17
Payer: COMMERCIAL

## 2020-01-17 VITALS
BODY MASS INDEX: 21.8 KG/M2 | OXYGEN SATURATION: 94 % | HEIGHT: 67 IN | DIASTOLIC BLOOD PRESSURE: 86 MMHG | RESPIRATION RATE: 15 BRPM | HEART RATE: 84 BPM | TEMPERATURE: 97.8 F | WEIGHT: 138.89 LBS | SYSTOLIC BLOOD PRESSURE: 141 MMHG

## 2020-01-17 DIAGNOSIS — Z45.010 ELECTIVE REPLACEMENT INDICATED FOR CARDIAC PACEMAKER BATTERY AT END OF LIFESPAN: ICD-10-CM

## 2020-01-17 DIAGNOSIS — I48.4 ATYPICAL ATRIAL FLUTTER (HCC): ICD-10-CM

## 2020-01-17 LAB
ALBUMIN SERPL BCP-MCNC: 4 G/DL (ref 3.2–4.9)
ALBUMIN/GLOB SERPL: 1.2 G/DL
ALP SERPL-CCNC: 49 U/L (ref 30–99)
ALT SERPL-CCNC: 8 U/L (ref 2–50)
ANION GAP SERPL CALC-SCNC: 9 MMOL/L (ref 0–11.9)
AST SERPL-CCNC: 17 U/L (ref 12–45)
BILIRUB SERPL-MCNC: 0.8 MG/DL (ref 0.1–1.5)
BUN SERPL-MCNC: 19 MG/DL (ref 8–22)
CALCIUM SERPL-MCNC: 9.1 MG/DL (ref 8.5–10.5)
CHLORIDE SERPL-SCNC: 102 MMOL/L (ref 96–112)
CO2 SERPL-SCNC: 27 MMOL/L (ref 20–33)
CREAT SERPL-MCNC: 1.26 MG/DL (ref 0.5–1.4)
EKG IMPRESSION: NORMAL
ERYTHROCYTE [DISTWIDTH] IN BLOOD BY AUTOMATED COUNT: 46.1 FL (ref 35.9–50)
GLOBULIN SER CALC-MCNC: 3.4 G/DL (ref 1.9–3.5)
GLUCOSE SERPL-MCNC: 92 MG/DL (ref 65–99)
HCT VFR BLD AUTO: 43.3 % (ref 42–52)
HGB BLD-MCNC: 14.9 G/DL (ref 14–18)
INR PPP: 0.97 (ref 0.87–1.13)
MCH RBC QN AUTO: 31 PG (ref 27–33)
MCHC RBC AUTO-ENTMCNC: 34.4 G/DL (ref 33.7–35.3)
MCV RBC AUTO: 90 FL (ref 81.4–97.8)
PLATELET # BLD AUTO: 185 K/UL (ref 164–446)
PMV BLD AUTO: 9.7 FL (ref 9–12.9)
POTASSIUM SERPL-SCNC: 4.6 MMOL/L (ref 3.6–5.5)
PROT SERPL-MCNC: 7.4 G/DL (ref 6–8.2)
PROTHROMBIN TIME: 13.1 SEC (ref 12–14.6)
RBC # BLD AUTO: 4.81 M/UL (ref 4.7–6.1)
SODIUM SERPL-SCNC: 138 MMOL/L (ref 135–145)
TSH SERPL DL<=0.005 MIU/L-ACNC: 4.8 UIU/ML (ref 0.38–5.33)
WBC # BLD AUTO: 5.6 K/UL (ref 4.8–10.8)

## 2020-01-17 PROCEDURE — 306637 HCHG MISC ORTHO ITEM RC 0274

## 2020-01-17 PROCEDURE — 85027 COMPLETE CBC AUTOMATED: CPT

## 2020-01-17 PROCEDURE — 93005 ELECTROCARDIOGRAM TRACING: CPT | Performed by: INTERNAL MEDICINE

## 2020-01-17 PROCEDURE — 33228 REMV&REPLC PM GEN DUAL LEAD: CPT | Performed by: INTERNAL MEDICINE

## 2020-01-17 PROCEDURE — 306637 CL-EP ABLATION ATRIAL FLUTTER

## 2020-01-17 PROCEDURE — 700105 HCHG RX REV CODE 258: Performed by: INTERNAL MEDICINE

## 2020-01-17 PROCEDURE — 160002 HCHG RECOVERY MINUTES (STAT)

## 2020-01-17 PROCEDURE — 80053 COMPREHEN METABOLIC PANEL: CPT

## 2020-01-17 PROCEDURE — 93312 ECHO TRANSESOPHAGEAL: CPT

## 2020-01-17 PROCEDURE — 93619 COMPREHENSIVE EP EVALUATION: CPT

## 2020-01-17 PROCEDURE — 700111 HCHG RX REV CODE 636 W/ 250 OVERRIDE (IP)

## 2020-01-17 PROCEDURE — 93286 PERI-PX EVAL PM/LDLS PM IP: CPT

## 2020-01-17 PROCEDURE — 93286 PERI-PX EVAL PM/LDLS PM IP: CPT | Mod: 26,59 | Performed by: INTERNAL MEDICINE

## 2020-01-17 PROCEDURE — 305383 CL-EP ABLATION ATRIAL FLUTTER

## 2020-01-17 PROCEDURE — 93010 ELECTROCARDIOGRAM REPORT: CPT | Mod: 59 | Performed by: INTERNAL MEDICINE

## 2020-01-17 PROCEDURE — 84443 ASSAY THYROID STIM HORMONE: CPT

## 2020-01-17 PROCEDURE — 700111 HCHG RX REV CODE 636 W/ 250 OVERRIDE (IP): Performed by: ANESTHESIOLOGY

## 2020-01-17 PROCEDURE — 700101 HCHG RX REV CODE 250

## 2020-01-17 PROCEDURE — 700101 HCHG RX REV CODE 250: Performed by: ANESTHESIOLOGY

## 2020-01-17 PROCEDURE — 92960 CARDIOVERSION ELECTRIC EXT: CPT | Mod: 59 | Performed by: INTERNAL MEDICINE

## 2020-01-17 PROCEDURE — 93620 COMP EP EVL R AT VEN PAC&REC: CPT | Mod: 26 | Performed by: INTERNAL MEDICINE

## 2020-01-17 PROCEDURE — 85610 PROTHROMBIN TIME: CPT

## 2020-01-17 PROCEDURE — C1785 PMKR, DUAL, RATE-RESP: HCPCS

## 2020-01-17 PROCEDURE — 93621 COMP EP EVL L PAC&REC C SINS: CPT | Mod: 26,59 | Performed by: INTERNAL MEDICINE

## 2020-01-17 RX ORDER — TRAMADOL HYDROCHLORIDE 50 MG/1
50 TABLET ORAL EVERY 6 HOURS PRN
Status: DISCONTINUED | OUTPATIENT
Start: 2020-01-17 | End: 2020-01-17 | Stop reason: HOSPADM

## 2020-01-17 RX ORDER — DOXYCYCLINE 100 MG/1
100 CAPSULE ORAL 2 TIMES DAILY
Qty: 8 CAP | Refills: 0 | Status: SHIPPED | OUTPATIENT
Start: 2020-01-17 | End: 2020-04-28

## 2020-01-17 RX ORDER — OXYCODONE HCL 5 MG/5 ML
5 SOLUTION, ORAL ORAL
Status: DISCONTINUED | OUTPATIENT
Start: 2020-01-17 | End: 2020-01-17 | Stop reason: HOSPADM

## 2020-01-17 RX ORDER — SODIUM CHLORIDE, SODIUM LACTATE, POTASSIUM CHLORIDE, CALCIUM CHLORIDE 600; 310; 30; 20 MG/100ML; MG/100ML; MG/100ML; MG/100ML
INJECTION, SOLUTION INTRAVENOUS CONTINUOUS
Status: DISCONTINUED | OUTPATIENT
Start: 2020-01-17 | End: 2020-01-17 | Stop reason: HOSPADM

## 2020-01-17 RX ORDER — ONDANSETRON 2 MG/ML
INJECTION INTRAMUSCULAR; INTRAVENOUS PRN
Status: DISCONTINUED | OUTPATIENT
Start: 2020-01-17 | End: 2020-01-17 | Stop reason: SURG

## 2020-01-17 RX ORDER — HYDROMORPHONE HYDROCHLORIDE 2 MG/ML
0.1 INJECTION, SOLUTION INTRAMUSCULAR; INTRAVENOUS; SUBCUTANEOUS
Status: DISCONTINUED | OUTPATIENT
Start: 2020-01-17 | End: 2020-01-17 | Stop reason: HOSPADM

## 2020-01-17 RX ORDER — LIDOCAINE HYDROCHLORIDE 20 MG/ML
INJECTION, SOLUTION INFILTRATION; PERINEURAL
Status: COMPLETED
Start: 2020-01-17 | End: 2020-01-17

## 2020-01-17 RX ORDER — OXYCODONE HCL 5 MG/5 ML
10 SOLUTION, ORAL ORAL
Status: DISCONTINUED | OUTPATIENT
Start: 2020-01-17 | End: 2020-01-17 | Stop reason: HOSPADM

## 2020-01-17 RX ORDER — HALOPERIDOL 5 MG/ML
1 INJECTION INTRAMUSCULAR
Status: DISCONTINUED | OUTPATIENT
Start: 2020-01-17 | End: 2020-01-17 | Stop reason: HOSPADM

## 2020-01-17 RX ORDER — HYDROMORPHONE HYDROCHLORIDE 2 MG/ML
0.4 INJECTION, SOLUTION INTRAMUSCULAR; INTRAVENOUS; SUBCUTANEOUS
Status: DISCONTINUED | OUTPATIENT
Start: 2020-01-17 | End: 2020-01-17 | Stop reason: HOSPADM

## 2020-01-17 RX ORDER — HYDRALAZINE HYDROCHLORIDE 20 MG/ML
5 INJECTION INTRAMUSCULAR; INTRAVENOUS
Status: DISCONTINUED | OUTPATIENT
Start: 2020-01-17 | End: 2020-01-17 | Stop reason: HOSPADM

## 2020-01-17 RX ORDER — ONDANSETRON 2 MG/ML
4 INJECTION INTRAMUSCULAR; INTRAVENOUS
Status: DISCONTINUED | OUTPATIENT
Start: 2020-01-17 | End: 2020-01-17 | Stop reason: HOSPADM

## 2020-01-17 RX ORDER — HEPARIN SODIUM,PORCINE 1000/ML
VIAL (ML) INJECTION
Status: COMPLETED
Start: 2020-01-17 | End: 2020-01-17

## 2020-01-17 RX ORDER — CEFAZOLIN SODIUM 1 G/3ML
INJECTION, POWDER, FOR SOLUTION INTRAMUSCULAR; INTRAVENOUS
Status: COMPLETED
Start: 2020-01-17 | End: 2020-01-17

## 2020-01-17 RX ORDER — MEPERIDINE HYDROCHLORIDE 25 MG/ML
12.5 INJECTION INTRAMUSCULAR; INTRAVENOUS; SUBCUTANEOUS
Status: DISCONTINUED | OUTPATIENT
Start: 2020-01-17 | End: 2020-01-17 | Stop reason: HOSPADM

## 2020-01-17 RX ORDER — HYDROMORPHONE HYDROCHLORIDE 2 MG/ML
0.2 INJECTION, SOLUTION INTRAMUSCULAR; INTRAVENOUS; SUBCUTANEOUS
Status: DISCONTINUED | OUTPATIENT
Start: 2020-01-17 | End: 2020-01-17 | Stop reason: HOSPADM

## 2020-01-17 RX ORDER — DEXAMETHASONE SODIUM PHOSPHATE 4 MG/ML
INJECTION, SOLUTION INTRA-ARTICULAR; INTRALESIONAL; INTRAMUSCULAR; INTRAVENOUS; SOFT TISSUE PRN
Status: DISCONTINUED | OUTPATIENT
Start: 2020-01-17 | End: 2020-01-17 | Stop reason: SURG

## 2020-01-17 RX ORDER — HEPARIN SODIUM 200 [USP'U]/100ML
INJECTION, SOLUTION INTRAVENOUS
Status: COMPLETED
Start: 2020-01-17 | End: 2020-01-17

## 2020-01-17 RX ORDER — ACETAMINOPHEN 325 MG/1
650 TABLET ORAL EVERY 4 HOURS PRN
Status: DISCONTINUED | OUTPATIENT
Start: 2020-01-17 | End: 2020-01-17 | Stop reason: HOSPADM

## 2020-01-17 RX ADMIN — LIDOCAINE HYDROCHLORIDE: 20 INJECTION, SOLUTION INFILTRATION; PERINEURAL at 12:00

## 2020-01-17 RX ADMIN — LIDOCAINE HYDROCHLORIDE: 20 INJECTION, SOLUTION INFILTRATION; PERINEURAL at 12:15

## 2020-01-17 RX ADMIN — SUGAMMADEX 200 MG: 100 INJECTION, SOLUTION INTRAVENOUS at 13:26

## 2020-01-17 RX ADMIN — SODIUM CHLORIDE, POTASSIUM CHLORIDE, SODIUM LACTATE AND CALCIUM CHLORIDE: 600; 310; 30; 20 INJECTION, SOLUTION INTRAVENOUS at 10:30

## 2020-01-17 RX ADMIN — FENTANYL CITRATE 25 MCG: 50 INJECTION, SOLUTION INTRAMUSCULAR; INTRAVENOUS at 13:01

## 2020-01-17 RX ADMIN — ONDANSETRON 4 MG: 2 INJECTION INTRAMUSCULAR; INTRAVENOUS at 13:11

## 2020-01-17 RX ADMIN — DEXAMETHASONE SODIUM PHOSPHATE 4 MG: 4 INJECTION, SOLUTION INTRA-ARTICULAR; INTRALESIONAL; INTRAMUSCULAR; INTRAVENOUS; SOFT TISSUE at 12:19

## 2020-01-17 RX ADMIN — FENTANYL CITRATE 25 MCG: 50 INJECTION, SOLUTION INTRAMUSCULAR; INTRAVENOUS at 12:55

## 2020-01-17 RX ADMIN — HEPARIN SODIUM 2000 UNITS: 200 INJECTION, SOLUTION INTRAVENOUS at 12:33

## 2020-01-17 RX ADMIN — PROPOFOL 200 MG: 10 INJECTION, EMULSION INTRAVENOUS at 12:19

## 2020-01-17 RX ADMIN — LIDOCAINE HYDROCHLORIDE 40 MG: 20 INJECTION, SOLUTION INFILTRATION; PERINEURAL at 12:19

## 2020-01-17 RX ADMIN — CEFAZOLIN 3000 MG: 330 INJECTION, POWDER, FOR SOLUTION INTRAMUSCULAR; INTRAVENOUS at 12:32

## 2020-01-17 RX ADMIN — ROCURONIUM BROMIDE 100 MG: 10 INJECTION, SOLUTION INTRAVENOUS at 12:19

## 2020-01-17 NOTE — CATH LAB
Electrophysiology Procedure Note  [facility]    Pre-Operative Diagnosis: Atrial flutter right versus left-sided    Post-Operative Diagnosis: Left-sided atrial flutter.  DC cardioversion     Indications: As above     Procedure(s) Performed: Electrophysiology Study,  CS/LA pace and record, transesophageal echocardiogram, DC cardioversion, pre-and post reprogramming of dual-chamber pacemaker    Physician(s): OLIVE Godinez M.D.     Anesthesia: General, Dr Tapia    Specimen(s) Removed: None     Estimated Blood Loss:  20cc    Pre-procedure ECG: Atypical atrial flutter with RV pacing    Post-procedure ECG: Sinus rhythm with RV pacing    Complications:  None     Procedure:     After informed written consent, the patient was brought to the EP lab in the fasting, non-sedated state.  General anesthesia was given the patient as per Dr. Tapia .  Patient's device was programmed appropriately at baseline. Transesophageal echocardiogram was performed which showed no evidence of left atrial appendage clot . The patient was prepped and draped in the usual sterile fashion. Femoral venous access was obtained using the modified Seldinger technique.  In the right femoral vein, 1 sheath (6 Fr) was inserted over 0.35” guidewire.  A deflectable decapolar catheter was advanced to the CS position.  Activation along the coronary sinus was eccentric with left-sided flutter at 220 ms earliest at CS 1 2 . Entrainment from the left side was concealed.  Patient received DC cardioversion to sinus rhythm. At the end of the procedure, the catheter and sheath were removed, and hemostasis was achieved by manual compression.  Patient's pacemaker was reprogrammed to previous settings.    Baseline Rhythm: Atrial flutter 220 ms.    Arrhythmias:  1.  Left atrial flutter.  Distal to proximal activation.  2.  Entrainment concealed from CS catheter distal.     Mapping:  Using the CS catheter and right atrial activation.     Fluroscopy time: 1 min      Impressions:    1.  Left atrial flutter  2.  DC cardioversion  3.  Plan generator change     Plan:   1. Admit to monitored bed  2. Bedrest x 4 hours

## 2020-01-17 NOTE — ANESTHESIA PREPROCEDURE EVALUATION
Relevant Problems   NEURO   (+) S/P ablation of atrial flutter   (+) TIA (transient ischemic attack)      CARDIAC   (+) Atrial flutter (HCC)   (+) PAF (paroxysmal atrial fibrillation) (HCC)   (+) Presence of permanent cardiac pacemaker   (+) SSS (sick sinus syndrome) (HCC)      Other   (+) Macular degeneration       Physical Exam    Airway   Mallampati: II  TM distance: >3 FB  Neck ROM: full       Cardiovascular - normal exam  Rhythm: irregular  Rate: normal  (-) murmur     Dental            Pulmonary - normal exam  Breath sounds clear to auscultation     Abdominal    Neurological - normal exam                 Anesthesia Plan    ASA 3 (A-flutter)   ASA physical status 3 criteria: other (comment)    Plan - general       Airway plan will be ETT        Induction: intravenous      Pertinent diagnostic labs and testing reviewed    Informed Consent:    Anesthetic plan and risks discussed with patient.

## 2020-01-17 NOTE — ANESTHESIA PROCEDURE NOTES
Airway  Date/Time: 1/17/2020 12:20 PM  Performed by: Jose Alejandro Tapia M.D.  Authorized by: Jose Alejandro Tapia M.D.     Location:  OR  Urgency:  Elective  Indications for Airway Management:  Anesthesia  Spontaneous Ventilation: absent    Sedation Level:  Deep  Preoxygenated: Yes    Patient Position:  Sniffing  Final Airway Type:  Endotracheal airway  Final Endotracheal Airway:  ETT  Cuffed: Yes    Technique Used for Successful ETT Placement:  Direct laryngoscopy  Insertion Site:  Oral  Blade Type:  Aram  Laryngoscope Blade/Videolaryngoscope Blade Size:  4  ETT Size (mm):  7.5  Measured from:  Teeth  ETT to Teeth (cm):  23  Placement Verified by: auscultation and capnometry    Cormack-Lehane Classification:  Grade I - full view of glottis  Number of Attempts at Approach:  1

## 2020-01-17 NOTE — DISCHARGE INSTRUCTIONS
"  ACTIVITY: Rest and take it easy for the first 24 hours.  A responsible adult is recommended to remain with you during that time.  It is normal to feel sleepy.  We encourage you to not do anything that requires balance, judgment or coordination.    MILD FLU-LIKE SYMPTOMS ARE NORMAL. YOU MAY EXPERIENCE GENERALIZED MUSCLE ACHES, THROAT IRRITATION, HEADACHE AND/OR SOME NAUSEA.    FOR 24 HOURS DO NOT:  Drive, operate machinery or run household appliances.  Drink beer or alcoholic beverages.   Make important decisions or sign legal documents.    SPECIAL INSTRUCTIONS:     Follow up in device clinic in 1 week.   Resume eliquis tomorrow night if no significant bleeding  Post Angiogram Groin Care Instructions     INSTRUCTIONS  1. Examine (look and feel) the site of your incision site TODAY so you can recognize changes that should be called to your doctor (see below).  2. Avoid straining either by lifting or pulling objects for 4-5 days. Avoid lifting over 5 pounds.   3. For at least 72 hours, if you should sneeze or cough, please hold pressure over your groin area.  4. If you should begin to have oozing from the catheterization site, please hold firm pressure and call your doctor's office immediately.  5. If profuse bleeding occurs from the catheterization site, hold firm pressure and call \"261\" immediately for assistance.  6. Remove bandage after 24 hours.     ACTIVITY  1. Limit activity as instructed by your doctor.  2. No driving or very limited driving with frequent stops for one week.   3. If you must take a long car ride, stop every hour and walk around the car.   4. Warm showers or baths are permitted after the bandage is removed. Avoid hot showers, baths, hot tubs, and swimming for one week.    PLEASE CALL YOUR DOCTOR IF:  1. Temperature elevation occurs.  2. Catheterization site becomes reddened or begins to drain.   3. Bruising appears to be new or not resolving. The bruise may move down your leg. This is " normal.  4. The small round lump in the groin increases in size.  5. Any leg numbness, aching, or discomfort (immediately).  6. Increasing discomfort in the leg at the insertion site.  7. Chest pains, even if relieved by Nitroglycerin.    MISCELLANEOUS INSTRUCTIONS  1. Bruising may occur as a result of heart catheterization. Some of the discoloration may travel down the leg, going from blue to green in color.  2. A small round lump under the catheterization site will remain for up to six weeks.  3. If any questions arise call your physician's office. You can also call the [facility] open 24 hours/day, 7 days/week and speak to a nurse at [phone], or toll free at [phone].   4. You should call 911 if you develop problems with breathing or chest pain.    PACEMAKER / AICD    DISCHARGE INSTRUCTIONS      WOUND CARE:    • No Showers for one week, you may take a sponge bath.  Keep your dressing dry.  No submerging in bath tub, hot tub, swimming, etc. for six weeks.  • Leave your dressing in place until your follow up appointment.    • No lifting over 5-10 pounds for one week; this applies to affected side only.  • Avoid excessive pushing, pulling, or twisting for six weeks; this applies to affected side only.  • Call your doctor’s office if you notice any increased swelling, redness, or any drainage at the incision site.  Also notify your doctor if you develop a fever.  • You can also call the [facility] open 24 hours/day, 7 days/week and speak to a nurse at [phone], or toll free at [phone].  • Seven to ten days after implant, your cardiologist’s office will schedule a visit for you with a nurse to check your incision site.  The nurse will remove your original dressing at this time.  If you have an AICD, you will be enrolled in an AICD clinic.  • For AICD’s - you should be checked every three months or as determined by your cardiologist.    • Do not drive until you have been cleared to do  so by your cardiologist.  • Call 911 if you develop problems with breathing or chest pain.    IF YOU RECEIVE A SHOCK FROM YOUR AICD:    • When you receive your first shock, please call your cardiologist and schedule an appointment in the pacemaker/AICD clinic.  If you are being shocked multiple times, please call 911.  • Once you have been evaluated after the initial shock with the pacemaker/AICD nurse, you should notify your doctor if you receive 3 or more shocks in a 24 hour period.  • If you experience any near fainting or fainting episodes, please call your cardiologists office.  • For detailed information on your particular pacemaker or AICD, please read the patient guide that has been provided to you by the company representative.      DIET: To avoid nausea, slowly advance diet as tolerated, avoiding spicy or greasy foods for the first day.  Add more substantial food to your diet according to your physician's instructions.  Babies can be fed formula or breast milk as soon as they are hungry.  INCREASE FLUIDS AND FIBER TO AVOID CONSTIPATION.    SURGICAL DRESSING/BATHING: do not shower for 7 days, sponge bath only     FOLLOW-UP APPOINTMENT:  A follow-up appointment should be arranged with your cardiologist at [phone]; call to schedule.    You should CALL YOUR PHYSICIAN if you develop:  Fever greater than 101 degrees F.  Pain not relieved by medication, or persistent nausea or vomiting.  Excessive bleeding (blood soaking through dressing) or unexpected drainage from the wound.  Extreme redness or swelling around the incision site, drainage of pus or foul smelling drainage.  Inability to urinate or empty your bladder within 8 hours.  Problems with breathing or chest pain.    You should call 911 if you develop problems with breathing or chest pain.  If you are unable to contact your doctor or surgical center, you should go to the nearest emergency room or urgent care center.  Physician's telephone #:  [phone]    If any questions arise, call your doctor.  If your doctor is not available, please feel free to call the Surgical Center at [phone].  The Center is open Monday through Friday from 7AM to 7PM.  You can also call the [facility] open 24 hours/day, 7 days/week and speak to a nurse at [phone], or toll free at [phone].    A registered nurse may call you a few days after your surgery to see how you are doing after your procedure.    MEDICATIONS: Resume taking daily medication.  Take prescribed pain medication with food.  If no medication is prescribed, you may take non-aspirin pain medication if needed.  PAIN MEDICATION CAN BE VERY CONSTIPATING.  Take a stool softener or laxative such as senokot, pericolace, or milk of magnesia if needed.    If your physician has prescribed pain medication that includes Acetaminophen (Tylenol), do not take additional Acetaminophen (Tylenol) while taking the prescribed medication.    Depression / Suicide Risk    As you are discharged from this [facility] facility, it is important to learn how to keep safe from harming yourself.    Recognize the warning signs:  · Abrupt changes in personality, positive or negative- including increase in energy   · Giving away possessions  · Change in eating patterns- significant weight changes-  positive or negative  · Change in sleeping patterns- unable to sleep or sleeping all the time   · Unwillingness or inability to communicate  · Depression  · Unusual sadness, discouragement and loneliness  · Talk of wanting to die  · Neglect of personal appearance   · Rebelliousness- reckless behavior  · Withdrawal from people/activities they love  · Confusion- inability to concentrate     If you or a loved one observes any of these behaviors or has concerns about self-harm, here's what you can do:  · Talk about it- your feelings and reasons for harming yourself  · Remove any means that you might use to hurt yourself  (examples: pills, rope, extension cords, firearm)  · Get professional help from the community (Mental Health, Substance Abuse, psychological counseling)  · Do not be alone:Call your Safe Contact- someone whom you trust who will be there for you.  · Call your local CRISIS HOTLINE 550-6106 or 591-985-4394  · Call your local Children's Mobile Crisis Response Team [place] (058) 467-8347 or www.  · Call the toll free National Suicide Prevention Hotlines   · National Suicide Prevention Lifeline 712-037-MIDE (4119)  · National Hope Line Network 800-SUICIDE (109-3560)

## 2020-01-17 NOTE — ANESTHESIA QCDR
2019 Qualified Clinical Data Registry (for Quality Improvement)     Postoperative nausea/vomiting risk protocol (Adult = 18 yrs and Pediatric 3-17 yrs)- (430 and 463)  General inhalation anesthetic (NOT TIVA) with PONV risk factors: Yes  Provision of anti-emetic therapy with at least 2 different classes of agents: Yes   Patient DID NOT receive anti-emetic therapy and reason is documented in Medical Record:  N/A    Multimodal Pain Management- (477)  Non-emergent surgery AND patient age >= 18: Yes  Use of Multimodal Pain Management, two or more drugs and/or interventions, NOT including systemic opioids: No  Exception: Documented allergy to multiple classes of analgesics: No       Smoking Abstinence (404)  Patient is current smoker (cigarette, pipe, e-cig, marijuanna): No  Elective Surgery:   Abstinence instructions provided prior to day of surgery:   Patient abstained from smoking on day of surgery:     Pre-Op Beta-Blocker in Isolated CABG (44)  Isolated CABG AND patient age >= 18: No  Beta-blocker admin within 24 hours of surgical incision:   Exception:of medical reason(s) for not administering beta blocker within 24 hours prior to surgical incision (e.g., not  indicated,other medical reason):     PACU assessment of acute postoperative pain prior to Anesthesia Care End- Applies to Patients Age = 18- (ABG7)  Initial PACU pain score is which of the following: Pain not assessed  Patient unable to report pain score: Yes (Patient Unable to Report)    Post-anesthetic transfer of care checklist/protocol to PACU/ICU- (426 and 427)  Upon conclusion of case, patient transferred to which of the following locations: PACU/Non-ICU  Use of transfer checklist/protocol: Yes  Exclusion: Service Performed in Patient Hospital Room (and thus did not require transfer): N/A  Unplanned admission to ICU related to anesthesia service up through end of PACU care- (MD51)  Unplanned admission to ICU (not initially anticipated at anesthesia start  time): No

## 2020-01-17 NOTE — ANESTHESIA TIME REPORT
Anesthesia Start and Stop Event Times     Date Time Event    1/17/2020 1151 Ready for Procedure     1211 Anesthesia Start     1338 Anesthesia Stop        Responsible Staff  01/17/20    Name Role Begin End    Jose Alejandro Tapia M.D. Anesth 1211 1338        Preop Diagnosis (Free Text):  Pre-op Diagnosis             Preop Diagnosis (Codes):    Post op Diagnosis  Atrial flutter (HCC)      Premium Reason  Non-Premium    Comments:

## 2020-01-17 NOTE — OP REPORT
Electrophysiology Procedure Note  [facility]    PROCEDURE: Dual-chamber pacemaker generator change,   moderate sedation administered by RN and supervised by physician    : Durga Godinez M.D.    ANESTHESIA: General anesthesia, Dr CAMPOS Tapia    ESTIMATED BLOOD LOSS: 20 cc.    SPECIMENS: None.    COMPLICATIONS: None    INDICATION: Pacemaker at [facility]    PRE-PROCEDURE ECG: Dual-chamber pacing    POST-PROCEDURE ECG: Dual-chamber pacing    DESCRIPTION OF PROCEDURE: After informed written consent, the patient was brought to the electrophysiology lab in the fasting, unsedated state. The patient was prepped and draped in the usual sterile fashion. The procedure was performed under general with local anesthetic. An incision was made with a scalpel along the old scar. Access to the device pocket was made using a combination of blunt dissection and electrocautery. The old generator and leads were freed from adhesions and the generator disconnected from the leads. Leads tested fine.  The pocket was irrigated with antibiotic solution, and the new generator was connected to the leads and inserted back in the pocket.  The header was sewn down to the muscle with a nonabsorbable suture to prevent slippage.  The wound was closed with three layers of absorbable sutures and covered with Steri-Strips.   Following recovery from sedation, the patient was transferred to a monitored bed.    IMPLANTED DEVICE INFORMATION:    Pulse generator is a BeMyEye model W3DR01  Serial number [id number]     LEAD INFORMATION:  1. Right atrial lead is a Medtronic model 5076-52, serial number [id number], P wave 2 millivolts, threshold 1 volts, pacing impedance 361 ohms, implant date 8/31/2009  2. Right ventricular lead is a Medtronic model 5076-58, serial number [id number], R wave paced millivolts, threshold 1.25 volts, pacing impedance 399 ohms, implant date 8/31/2009    DEVICE PROGRAMMING:    As previous programmed      IMPRESSIONS:  1. Successful DDDR generator change.    RECOMMENDATIONS:  1. Transfer to PPU.  2. Follow-up in device clinic for wound check and device interrogation.

## 2020-01-17 NOTE — H&P
"Physician H&P    Patient ID:  Abhilash Rios  [id number]  89 y.o. male  7/24/1929    History:  Primary Diagnosis: Current flutter and pacemaker at [facility]    HPI:  Previous flutter ablation 2010 at [facility].  Currently on Eliquis.  Repeat atrial flutter possibly left-sided versus right-sided.  During previous procedure did have left-sided atrial flutter.  Only had right atrial isthmus ablation.  Very mild fatigue.  Patient here for electrophysiology study and RF ablation of right sided.  If left-sided than cardioversion.  Also generator change.  SARY being performed.    Past Medical History:  has a past medical history of Atrial flutter ([facility]) (3/8/2012), Dizziness (3/8/2012), Macular degeneration (3/8/2012), Pacemaker, Pre-syncope (3/8/2012), Presence of permanent cardiac pacemaker (3/8/2012), S/P AV amy ablation (3/8/2012), SSS (sick sinus syndrome) ([facility]) (3/8/2012), TIA (transient ischemic attack) (3/8/2012), and Vertigo (3/8/2012).  Past Surgical History:  has a past surgical history that includes eye surgery; other orthopedic surgery; rotator cuff repair; and trans urethral resection prostate.  Past Social History:  reports that he has never smoked. He has never used smokeless tobacco. He reports that he does not drink alcohol or use drugs.  Past Family History:   Family History   Problem Relation Age of Onset   • Non-contributory Other      Allergies: Sulfa drugs    Current Medications:  Prior to Admission medications    Medication Sig Start Date End Date Taking? Authorizing Provider   Ascorbic Acid (VITAMIN C PO) Take 1 Dose by mouth every day.    Physician Outpatient   Cholecalciferol (D3-1000) 1000 UNIT Cap Take 1 Cap by mouth.    Physician Outpatient   apixaban (ELIQUIS) 2.5mg Tab Take 1 Tab by mouth 2 Times a Day. 12/9/19   Durga Godinez M.D.       Review of Systems:  ROS  /75   Pulse 68   Temp 36.8 °C (98.3 °F) (Temporal)   Resp 18   Ht 1.702 m (5' 7\")   Wt 63 kg (138 lb 14.2 oz)   SpO2 99% "     Physical Examination:  Physical Exam  Vitals signs reviewed.   Constitutional:       General: He is not in acute distress.     Appearance: He is well-developed. He is not diaphoretic.   Eyes:      Conjunctiva/sclera: Conjunctivae normal.   Neck:      Thyroid: No thyroid mass or thyromegaly.      Vascular: No JVD.      Trachea: No tracheal deviation.   Cardiovascular:      Rate and Rhythm: Normal rate and regular rhythm.      Heart sounds: No murmur.   Pulmonary:      Effort: Pulmonary effort is normal. No respiratory distress.      Breath sounds: Normal breath sounds.   Chest:      Chest wall: No tenderness.   Abdominal:      Palpations: Abdomen is soft.      Tenderness: There is no tenderness.   Musculoskeletal: Normal range of motion.   Skin:     General: Skin is warm and dry.   Neurological:      Mental Status: He is alert and oriented to person, place, and time.      Motor: No tremor.   Psychiatric:         Behavior: Behavior normal.         Impression:  1.  Atrial flutter left versus right sided.  Placement of coronary sinus catheter following SARY.  If evidence of right-sided flutter then catheter ablation.  If left-sided cardioversion.  2.  Permanent pacemaker at [facility].  [facility]  The risks, benefits, and alternatives to atrial flutter ablation were discussed in great detail, specific risks mentioned including bleeding, infection, arteriovenous fistula related to  sheath placement, cardiac perforation with possible tamponade requiring pericardiocentesis or possibly open heart surgery. In addition, pneumothorax and hemothorax were mentioned with right internal jugular venous access. I also discussed the  possibility of permanent pacemaker placement due to to inadvertent AV block. In addition the risk of death, stroke and myocardial infarction were discussed. Success rate for typical atrial flutter at approximately 90%. Total complication rate estimated to be approximately 1%.  The patient verbalized  understanding of these potential complications and wishes to proceed with this procedure.   The risks, benefits, and alternatives to transesophageal echocardiogram with IV sedation were discussed with the patient in specific detail, including oropharyngeal and esophageal traumas including hoarseness and dysphagia after the procedure. Rare cases demonstrating serious or fatal complications associated with transesophageal echocardiogram have been reported in the adult population, including cardiac, pulmonary and bleeding complications in less than 1% of people. Patients with an identified intracardiac thrombus are at increased risk for embolic events and this appears to be reduced with anticoagulant therapy. The patient verbalized understandings about these  possible complications and wishes to proceed with this procedure  The risks, benefits, and alternatives to electrical cardioversion were discussed in great detail. We discussed that conversion of atrial fibrillation to normal rhythm, at least transiently, is successful in 90 to 95% of patients. However, maintaining a normal rhythm depends on a number of factors, including underlying heart disease and antiarrhythmic medications. Atrial fibrillation often recurs with time and other treatments may be necessary. Risks of  cardioversion are low as long as anticoagulation issues are handled appropriately. There is a small (less than 1%) risk of embolic events, including stroke. Risks of electrical shock include mild muscle soreness and mild skin burning at the site of electrode placement. There is also a risk that cardioversion can stimulate more dangerous arrhythmias. The patient verbalized understanding of these potential complications and wishes to proceed with this procedure.  The risks, benefits, and alternatives to permanent repacemaker placement were discussed in great detail.  Specific risks mentioned to the patient including bleeding, cardiac perforation with  possible tamponade possibly requiring pericardiocentesis or open heart surgery.  In addition the possibility of lead dislodgment (2-3%), pneumothorax (3%), hemothorax, infection were discussed.  Also, mentioned were the risk of death, stroke, and myocardial infarction.  The patient verbalized understanding of the potential complications and wishes to proceed with the procedure.        Pre Procedure Assessment:  Pre-Procedure Assessment - Applicable for patients receiving sedation by non-anesthesia practitioner.  Previous drug history, other anesthetic experiences, potential anesthetic problems and choice of anesthesia assessed, discussed with patient.     No prior complications.  Results of relevant diagnostic studies reviewed.    Plan of Sedation:  Patient assessed immediately prior to sedationPatient deemed appropriate candidate for conscious sedation options and plans discussed with patient / family    ASA 3 - Patient with severe systemic disease            Durga Godinez M.D.  1/17/2020

## 2020-01-18 NOTE — OR NURSING
1337 Pt report received from cath lab RN, pt brought over on a gurney by cath lab RN, pt placed on Tele monitor, VSS, no C/O pain at this time. Left upper quadrant PPM site is clean, dry and soft, dressing intact. Right going site is clean, dry and soft, dressing intact.     1345 Pt groin site clean, dry and soft, no C/O pain. Pt left upper quadrant site clean, dry and soft, no C/O pain     1400 Pt groin site clean, dry and soft, no C/O pain. Pt left upper quadrant site clean, dry and soft, no C/O pain     1415 Pt groin site clean, dry and soft, no C/O pain. Pt left upper quadrant site clean, dry and soft, no C/O pain     1430 Pt groin site clean, dry and soft, no C/O pain. Pt left upper quadrant site clean, dry and soft, no C/O pain     1730 pt given D/C instructions, pt verbalized understanding. Pt was given information post angiogram care and PPM care. Pt going home with family in wheelchair.

## 2020-01-19 LAB — EKG IMPRESSION: NORMAL

## 2020-01-19 PROCEDURE — 93010 ELECTROCARDIOGRAM REPORT: CPT | Performed by: INTERNAL MEDICINE

## 2020-01-20 LAB — LV EJECT FRACT  99904: 50

## 2020-01-20 NOTE — ANESTHESIA POSTPROCEDURE EVALUATION
Patient: Abhilash Rios    Procedure Summary     Date:  01/17/20 Room / Location:  [facility] IMAGING - CATH LAB [facility]    Anesthesia Start:  1211 Anesthesia Stop:  1338    Procedures:       CL-EP ABLATION ATRIAL FLUTTER      CL-PERMANENT PACEMAKER GEN CHANGE Diagnosis:       Atypical atrial flutter (HCC)      Elective replacement indicated for cardiac pacemaker battery at end of lifespan      Unspecified atrial flutter      (See Associated Dx)      (See Associated Dx)    Scheduled Providers:  Durga Godinez M.D.; Jose Alejandro Tapia M.D. Responsible Provider:  Jose Alejandro Tapia M.D.    Anesthesia Type:  general ASA Status:  3          Final Anesthesia Type: general  Last vitals  Anesthesia Post Evaluation    Patient location during evaluation: PACU  Patient participation: complete - patient participated  Level of consciousness: awake and alert    Airway patency: patent  Anesthetic complications: no  Cardiovascular status: hemodynamically stable  Respiratory status: acceptable  Hydration status: euvolemic    PONV: none           Nurse Pain Score: 0 (NPRS)

## 2020-01-24 ENCOUNTER — NON-PROVIDER VISIT (OUTPATIENT)
Dept: CARDIOLOGY | Facility: MEDICAL CENTER | Age: 85
End: 2020-01-24
Payer: COMMERCIAL

## 2020-01-24 DIAGNOSIS — Z95.0 PRESENCE OF PERMANENT CARDIAC PACEMAKER: Chronic | ICD-10-CM

## 2020-01-24 DIAGNOSIS — I49.5 SSS (SICK SINUS SYNDROME) (HCC): Chronic | ICD-10-CM

## 2020-01-24 PROCEDURE — 93280 PM DEVICE PROGR EVAL DUAL: CPT | Performed by: INTERNAL MEDICINE

## 2020-01-25 NOTE — NON-PROVIDER
Wound site is healing well.  Patient advised to watch for increased redness, swelling, oozing or fever--verbalized understanding.

## 2020-02-25 ENCOUNTER — NON-PROVIDER VISIT (OUTPATIENT)
Dept: CARDIOLOGY | Facility: MEDICAL CENTER | Age: 85
End: 2020-02-25
Payer: COMMERCIAL

## 2020-02-25 ENCOUNTER — OFFICE VISIT (OUTPATIENT)
Dept: CARDIOLOGY | Facility: MEDICAL CENTER | Age: 85
End: 2020-02-25
Payer: COMMERCIAL

## 2020-02-25 VITALS
OXYGEN SATURATION: 95 % | HEART RATE: 96 BPM | HEIGHT: 67 IN | WEIGHT: 145 LBS | SYSTOLIC BLOOD PRESSURE: 128 MMHG | DIASTOLIC BLOOD PRESSURE: 80 MMHG | BODY MASS INDEX: 22.76 KG/M2

## 2020-02-25 DIAGNOSIS — Z86.79 S/P ABLATION OF ATRIAL FLUTTER: ICD-10-CM

## 2020-02-25 DIAGNOSIS — I48.19 OTHER PERSISTENT ATRIAL FIBRILLATION (HCC): ICD-10-CM

## 2020-02-25 DIAGNOSIS — G45.9 TIA (TRANSIENT ISCHEMIC ATTACK): Chronic | ICD-10-CM

## 2020-02-25 DIAGNOSIS — Z95.0 PRESENCE OF PERMANENT CARDIAC PACEMAKER: Chronic | ICD-10-CM

## 2020-02-25 DIAGNOSIS — I49.5 SSS (SICK SINUS SYNDROME) (HCC): Chronic | ICD-10-CM

## 2020-02-25 DIAGNOSIS — Z79.01 ANTICOAGULATED: ICD-10-CM

## 2020-02-25 DIAGNOSIS — Z98.890 S/P ABLATION OF ATRIAL FLUTTER: ICD-10-CM

## 2020-02-25 DIAGNOSIS — I48.0 PAF (PAROXYSMAL ATRIAL FIBRILLATION) (HCC): ICD-10-CM

## 2020-02-25 PROCEDURE — 99214 OFFICE O/P EST MOD 30 MIN: CPT | Mod: 24 | Performed by: INTERNAL MEDICINE

## 2020-02-25 PROCEDURE — 93280 PM DEVICE PROGR EVAL DUAL: CPT | Performed by: INTERNAL MEDICINE

## 2020-02-25 NOTE — PROGRESS NOTES
Chief Complaint   Patient presents with   • Atrial Flutter     F/V DX:PAF       Subjective:   Abhilash Rios is a 89 y.o. male who presents today with history of atrial flutter status post ablation.  Recent permanent pacemaker generator change.  Adhesive just came off a few days ago.  Has not washed the area.  Minor rash.  No evidence of systemic infection or swelling.  Cardioversion of atrial fibrillation.  Maintaining sinus rhythm.  On anticoagulation.    Past Medical History:   Diagnosis Date   • Atrial flutter (HCC) 3/8/2012   • Dizziness 3/8/2012   • Macular degeneration 3/8/2012   • Pacemaker    • Pre-syncope 3/8/2012   • Presence of permanent cardiac pacemaker 3/8/2012   • SSS (sick sinus syndrome) (HCC) 3/8/2012   • TIA (transient ischemic attack) 3/8/2012   • Vertigo 3/8/2012     Past Surgical History:   Procedure Laterality Date   • EYE SURGERY      09/02/09 Status post eye surgery for macular degeneration.   • OTHER ORTHOPEDIC SURGERY      Lower Back Surgery   • ROTATOR CUFF REPAIR      09/02/09 Bilateral.   • TRANS URETHRAL RESECTION PROSTATE       Family History   Problem Relation Age of Onset   • Non-contributory Other      Social History     Socioeconomic History   • Marital status: Single     Spouse name: Not on file   • Number of children: Not on file   • Years of education: Not on file   • Highest education level: Not on file   Occupational History   • Not on file   Social Needs   • Financial resource strain: Not on file   • Food insecurity     Worry: Not on file     Inability: Not on file   • Transportation needs     Medical: Not on file     Non-medical: Not on file   Tobacco Use   • Smoking status: Never Smoker   • Smokeless tobacco: Never Used   Substance and Sexual Activity   • Alcohol use: No   • Drug use: No   • Sexual activity: Not on file   Lifestyle   • Physical activity     Days per week: Not on file     Minutes per session: Not on file   • Stress: Not on file   Relationships   • Social  "connections     Talks on phone: Not on file     Gets together: Not on file     Attends [employer] service: Not on file     Active member of club or organization: Not on file     Attends meetings of clubs or organizations: Not on file     Relationship status: Not on file   • Intimate partner violence     Fear of current or ex partner: Not on file     Emotionally abused: Not on file     Physically abused: Not on file     Forced sexual activity: Not on file   Other Topics Concern   • Not on file   Social History Narrative   • Not on file     Allergies   Allergen Reactions   • Sulfa Drugs Anaphylaxis   • Sulfasalazine Anaphylaxis     Outpatient Encounter Medications as of 2/25/2020   Medication Sig Dispense Refill   • Ascorbic Acid (VITAMIN C PO) Take 1 Dose by mouth every day.     • Cholecalciferol (D3-1000) 1000 UNIT Cap Take 1 Cap by mouth.     • apixaban (ELIQUIS) 2.5mg Tab Take 1 Tab by mouth 2 Times a Day. 180 Tab 11   • doxycycline (MONODOX) 100 MG capsule Take 1 Cap by mouth 2 times a day. (Patient not taking: Reported on 2/25/2020) 8 Cap 0     No facility-administered encounter medications on file as of 2/25/2020.      ROS     Objective:   /80 (BP Location: Left arm, Patient Position: Sitting, BP Cuff Size: Adult)   Pulse 96   Ht 1.702 m (5' 7.01\")   Wt 65.8 kg (145 lb)   SpO2 95%   BMI 22.70 kg/m²     Physical Exam   Constitutional: He is oriented to person, place, and time. He appears well-developed and well-nourished.   HENT:   Head: Normocephalic and atraumatic.   Eyes: EOM are normal.   Neck: Normal range of motion. Neck supple.   Cardiovascular: Normal rate, regular rhythm and intact distal pulses. Exam reveals no gallop and no friction rub.   No murmur heard.  Pulmonary/Chest: Effort normal and breath sounds normal.   Small erythematous rash over previous bandage site.   Abdominal: Soft.   Musculoskeletal: Normal range of motion.         General: No edema.   Neurological: He is alert and " oriented to person, place, and time.   Skin: Skin is warm and dry.   Psychiatric: He has a normal mood and affect. His behavior is normal. Judgment and thought content normal.       Assessment:     1. SSS (sick sinus syndrome) ([facility])     2. S/P ablation of atrial flutter     3. Presence of permanent cardiac pacemaker     4. PAF (paroxysmal atrial fibrillation) ([facility])     5. Other persistent atrial fibrillation     6. TIA (transient ischemic attack)     7. Anticoagulated         Medical Decision Making:  Today's Assessment / Status / Plan:   1.  Atrial fibrillation status post cardioversion maintaining sinus rhythm.  2.  Status post generator change.  Rash most likely related to adhesive.  Asked him to gently wash the area and continue to monitor it.  Follow-up in 1 month.  If worsens will call us.  No obvious infection.  3.  Atrial flutter status post ablation.  4.  Atrial fibrillation status post cardioversion.  5.  Anticoagulation continue.  6.  Follow-up with me in 1 month.

## 2020-03-27 ENCOUNTER — TELEPHONE (OUTPATIENT)
Dept: CARDIOLOGY | Facility: MEDICAL CENTER | Age: 85
End: 2020-03-27

## 2020-03-27 NOTE — TELEPHONE ENCOUNTER
Spoke with Fuentes. Pt. Doesn't have a computer or an I Phone. Rescheduled FV for 4-28-20 with Dr. Godinez.

## 2020-03-27 NOTE — TELEPHONE ENCOUNTER
"Called number given below, spoke with pt. Fuentes was not home.  Pt. Stated his wound site post gen change looks normal and rash has resolved. He is feeling okay except for a \"small, dry cough\". Pt. Does not think cough is caused by Covid-19. Pt. Has FV 3-31-20.  Pt. Declined telephone or other virtual  Appointment.   Left message for brother, Fuentes, to call [phone]) to reschedule 3-31-20 appointment.          "

## 2020-03-27 NOTE — TELEPHONE ENCOUNTER
DS    Hello, patient brother Fuentes does have some questions regarding patients appt on Tuesday 03/31. He would like a call back at [phone]

## 2020-03-31 ENCOUNTER — APPOINTMENT (OUTPATIENT)
Dept: CARDIOLOGY | Facility: MEDICAL CENTER | Age: 85
End: 2020-03-31
Payer: COMMERCIAL

## 2020-04-28 ENCOUNTER — TELEMEDICINE (OUTPATIENT)
Dept: CARDIOLOGY | Facility: MEDICAL CENTER | Age: 85
End: 2020-04-28
Payer: COMMERCIAL

## 2020-04-28 VITALS — BODY MASS INDEX: 22.76 KG/M2 | WEIGHT: 145 LBS | HEIGHT: 67 IN

## 2020-04-28 DIAGNOSIS — Z86.79 S/P ABLATION OF ATRIAL FLUTTER: ICD-10-CM

## 2020-04-28 DIAGNOSIS — Z95.0 PRESENCE OF PERMANENT CARDIAC PACEMAKER: Chronic | ICD-10-CM

## 2020-04-28 DIAGNOSIS — I48.0 PAF (PAROXYSMAL ATRIAL FIBRILLATION) (HCC): ICD-10-CM

## 2020-04-28 DIAGNOSIS — Z98.890 S/P ABLATION OF ATRIAL FLUTTER: ICD-10-CM

## 2020-04-28 DIAGNOSIS — G45.9 TIA (TRANSIENT ISCHEMIC ATTACK): Chronic | ICD-10-CM

## 2020-04-28 DIAGNOSIS — I48.3 TYPICAL ATRIAL FLUTTER (HCC): Chronic | ICD-10-CM

## 2020-04-28 DIAGNOSIS — I48.19 OTHER PERSISTENT ATRIAL FIBRILLATION (HCC): ICD-10-CM

## 2020-04-28 DIAGNOSIS — Z79.01 ANTICOAGULATED: ICD-10-CM

## 2020-04-28 PROCEDURE — 99442 PR PHYSICIAN TELEPHONE EVALUATION 11-20 MIN: CPT | Mod: CR | Performed by: INTERNAL MEDICINE

## 2020-04-28 ASSESSMENT — FIBROSIS 4 INDEX: FIB4 SCORE: 2.92

## 2020-04-28 NOTE — PROGRESS NOTES
Telephone Appointment Visit   As a means of avoiding spread of COVID-19, this visit is being conducted by telephone. This telephone visit was initiated by the patient and they verbally consented.    Time at start of call: 330 p.m.    Reason for Call:  Symptom Follow-up    Patient Comments / History:   History of sick sinus syndrome status post permanent pacemaker.  Recent generator change.  Rash on the site related to adhesive.  This is resolved.  No constitutional symptoms.  Cardioversion for atrial flutter left-sided.  Patient feels well.  On Eliquis    Labs / Images Reviewed       Assessment and Plan:     1. Anticoagulated    2. Typical atrial flutter (HCC)    3. S/P ablation of atrial flutter    4. TIA (transient ischemic attack)    5. Other persistent atrial fibrillation (HCC)    6. Presence of permanent cardiac pacemaker    7. PAF (paroxysmal atrial fibrillation) (HCC)  1.  Atrial arrhythmias continue Eliquis.  2.  Permanent pacemaker normal function.      Follow-up: Return in about 4 months (around 8/28/2020).    Time at end of call: 3:41 PM  Total Time Spent: 11-20 minutes    Durga Godinez M.D.

## 2020-04-28 NOTE — LETTER
[facility]   [address], [address]  [place] [address]  Phone: [phone]  Fax: [phone]              Abhilash Rios  7/24/1929    Encounter Date: 4/28/2020    Durga Godinez M.D.          PROGRESS NOTE:    Telephone Appointment Visit   As a means of avoiding spread of COVID-19, this visit is being conducted by telephone. This telephone visit was initiated by the patient and they verbally consented.    Time at start of call: 330 p.m.    Reason for Call:  Symptom Follow-up    Patient Comments / History:   History of sick sinus syndrome status post permanent pacemaker.  Recent generator change.  Rash on the site related to adhesive.  This is resolved.  No constitutional symptoms.  Cardioversion for atrial flutter left-sided.  Patient feels well.  On Eliquis    Labs / Images Reviewed       Assessment and Plan:     1. Anticoagulated    2. Typical atrial flutter (HCC)    3. S/P ablation of atrial flutter    4. TIA (transient ischemic attack)    5. Other persistent atrial fibrillation (HCC)    6. Presence of permanent cardiac pacemaker    7. PAF (paroxysmal atrial fibrillation) (HCC)  1.  Atrial arrhythmias continue Eliquis.  2.  Permanent pacemaker normal function.      Follow-up: Return in about 4 months (around 8/28/2020).    Time at end of call: 3:41 PM  Total Time Spent: 11-20 minutes    Durga Godinez M.D.      James Avila M.D.  [address]  [place] [address]  VIA Facsimile: [phone]

## 2020-07-14 ENCOUNTER — TELEPHONE (OUTPATIENT)
Dept: CARDIOLOGY | Facility: MEDICAL CENTER | Age: 85
End: 2020-07-14

## 2020-09-01 ENCOUNTER — OFFICE VISIT (OUTPATIENT)
Dept: CARDIOLOGY | Facility: MEDICAL CENTER | Age: 85
End: 2020-09-01
Payer: COMMERCIAL

## 2020-09-01 VITALS
SYSTOLIC BLOOD PRESSURE: 138 MMHG | WEIGHT: 142 LBS | HEART RATE: 88 BPM | HEIGHT: 67 IN | OXYGEN SATURATION: 95 % | BODY MASS INDEX: 22.29 KG/M2 | DIASTOLIC BLOOD PRESSURE: 88 MMHG

## 2020-09-01 DIAGNOSIS — Z95.0 PRESENCE OF PERMANENT CARDIAC PACEMAKER: Chronic | ICD-10-CM

## 2020-09-01 DIAGNOSIS — I47.29 NSVT (NONSUSTAINED VENTRICULAR TACHYCARDIA) (HCC): ICD-10-CM

## 2020-09-01 DIAGNOSIS — G45.9 TIA (TRANSIENT ISCHEMIC ATTACK): Chronic | ICD-10-CM

## 2020-09-01 DIAGNOSIS — Z86.79 S/P ABLATION OF ATRIAL FLUTTER: ICD-10-CM

## 2020-09-01 DIAGNOSIS — Z79.01 ANTICOAGULATED: ICD-10-CM

## 2020-09-01 DIAGNOSIS — I48.3 TYPICAL ATRIAL FLUTTER (HCC): Chronic | ICD-10-CM

## 2020-09-01 DIAGNOSIS — I49.5 SSS (SICK SINUS SYNDROME) (HCC): Chronic | ICD-10-CM

## 2020-09-01 DIAGNOSIS — R42 VERTIGO: Chronic | ICD-10-CM

## 2020-09-01 DIAGNOSIS — I48.0 PAF (PAROXYSMAL ATRIAL FIBRILLATION) (HCC): ICD-10-CM

## 2020-09-01 DIAGNOSIS — Z98.890 S/P ABLATION OF ATRIAL FLUTTER: ICD-10-CM

## 2020-09-01 PROCEDURE — 93280 PM DEVICE PROGR EVAL DUAL: CPT | Performed by: INTERNAL MEDICINE

## 2020-09-01 PROCEDURE — 99214 OFFICE O/P EST MOD 30 MIN: CPT | Mod: 25 | Performed by: INTERNAL MEDICINE

## 2020-09-01 ASSESSMENT — FIBROSIS 4 INDEX: FIB4 SCORE: 2.96

## 2020-09-01 NOTE — PROGRESS NOTES
Chief Complaint   Patient presents with   • Atrial Flutter     Follow up       Subjective:   Abhilash Rios is a 89 y.o. male who presents today with history of atrial fibrillation and atrial flutter left-sided.  Previous cardioversion.  Pacemaker.  Recent generator change.  Some paroxysmal atrial arrhythmias while maintaining sinus rhythm.  Some asymptomatic nonsustained VT. Borderline hypertension.  Overall feeling well.    Past Medical History:   Diagnosis Date   • Atrial flutter ([facility]) 3/8/2012   • Dizziness 3/8/2012   • Macular degeneration 3/8/2012   • Pacemaker    • Pre-syncope 3/8/2012   • Presence of permanent cardiac pacemaker 3/8/2012   • SSS (sick sinus syndrome) ([facility]) 3/8/2012   • TIA (transient ischemic attack) 3/8/2012   • Vertigo 3/8/2012     Past Surgical History:   Procedure Laterality Date   • EYE SURGERY      09/02/09 Status post eye surgery for macular degeneration.   • OTHER ORTHOPEDIC SURGERY      Lower Back Surgery   • ROTATOR CUFF REPAIR      09/02/09 Bilateral.   • TRANS URETHRAL RESECTION PROSTATE       Family History   Problem Relation Age of Onset   • Non-contributory Other      Social History     Socioeconomic History   • Marital status: Single     Spouse name: Not on file   • Number of children: Not on file   • Years of education: Not on file   • Highest education level: Not on file   Occupational History   • Not on file   Social Needs   • Financial resource strain: Not on file   • Food insecurity     Worry: Not on file     Inability: Not on file   • Transportation needs     Medical: Not on file     Non-medical: Not on file   Tobacco Use   • Smoking status: Never Smoker   • Smokeless tobacco: Never Used   Substance and Sexual Activity   • Alcohol use: No   • Drug use: No   • Sexual activity: Not on file   Lifestyle   • Physical activity     Days per week: Not on file     Minutes per session: Not on file   • Stress: Not on file   Relationships   • Social connections     Talks on phone:  "Not on file     Gets together: Not on file     Attends Sikhism service: Not on file     Active member of club or organization: Not on file     Attends meetings of clubs or organizations: Not on file     Relationship status: Not on file   • Intimate partner violence     Fear of current or ex partner: Not on file     Emotionally abused: Not on file     Physically abused: Not on file     Forced sexual activity: Not on file   Other Topics Concern   • Not on file   Social History Narrative   • Not on file     Allergies   Allergen Reactions   • Sulfa Drugs Anaphylaxis   • Sulfasalazine Anaphylaxis     Outpatient Encounter Medications as of 9/1/2020   Medication Sig Dispense Refill   • Ascorbic Acid (VITAMIN C PO) Take 1 Dose by mouth every day.     • Cholecalciferol (D3-1000) 1000 UNIT Cap Take 1 Cap by mouth.     • apixaban (ELIQUIS) 2.5mg Tab Take 1 Tab by mouth 2 Times a Day. 180 Tab 11     No facility-administered encounter medications on file as of 9/1/2020.      ROS     Objective:   /88 (BP Location: Left arm, Patient Position: Sitting, BP Cuff Size: Adult)   Pulse 88   Ht 1.702 m (5' 7\")   Wt 64.4 kg (142 lb)   SpO2 95%   BMI 22.24 kg/m²     Physical Exam   Constitutional: He is oriented to person, place, and time. He appears well-developed and well-nourished.   HENT:   Head: Normocephalic and atraumatic.   Eyes: EOM are normal.   Neck: Normal range of motion. Neck supple.   Cardiovascular: Normal rate, regular rhythm and intact distal pulses. Exam reveals no gallop and no friction rub.   No murmur heard.  Pulmonary/Chest: Effort normal and breath sounds normal.   Abdominal: Soft.   Musculoskeletal: Normal range of motion.         General: No edema.   Neurological: He is alert and oriented to person, place, and time.   Skin: Skin is warm and dry.   Psychiatric: He has a normal mood and affect. His behavior is normal. Judgment and thought content normal.       Assessment:     1. Typical atrial flutter " ([facility])     2. Anticoagulated     3. PAF (paroxysmal atrial fibrillation) ([facility])     4. S/P ablation of atrial flutter     5. SSS (sick sinus syndrome) ([facility])     6. TIA (transient ischemic attack)     7. Vertigo     8. Presence of permanent cardiac pacemaker     9. NSVT (nonsustained ventricular tachycardia) ([facility])         Medical Decision Making:  Today's Assessment / Status / Plan:   1.  Atrial arrhythmias.  Maintaining sinus rhythm some paroxysmal episodes minimal.  Continue anticoagulation.  2.  Nonsustained VT. Asymptomatic.  Low normal EF.  Continue to monitor.   3.  Borderline hypertension.  4.  Permanent pacemaker normal function.  5.  Follow-up in 6 months.

## 2021-01-13 DIAGNOSIS — Z23 NEED FOR VACCINATION: ICD-10-CM

## 2021-03-24 DIAGNOSIS — I48.0 PAF (PAROXYSMAL ATRIAL FIBRILLATION) (HCC): ICD-10-CM

## 2021-03-24 DIAGNOSIS — Z79.899 HIGH RISK MEDICATION USE: ICD-10-CM

## 2021-03-24 DIAGNOSIS — I48.3 TYPICAL ATRIAL FLUTTER (HCC): ICD-10-CM

## 2021-03-24 DIAGNOSIS — Z00.00 ANNUAL PHYSICAL EXAM: ICD-10-CM

## 2021-03-24 DIAGNOSIS — Z79.01 ANTICOAGULATED: ICD-10-CM

## 2021-04-22 ENCOUNTER — HOSPITAL ENCOUNTER (OUTPATIENT)
Dept: LAB | Facility: MEDICAL CENTER | Age: 86
End: 2021-04-22
Attending: INTERNAL MEDICINE
Payer: COMMERCIAL

## 2021-04-22 ENCOUNTER — TELEPHONE (OUTPATIENT)
Dept: CARDIOLOGY | Facility: MEDICAL CENTER | Age: 86
End: 2021-04-22

## 2021-04-22 DIAGNOSIS — I48.0 PAF (PAROXYSMAL ATRIAL FIBRILLATION) (HCC): ICD-10-CM

## 2021-04-22 DIAGNOSIS — Z79.01 ANTICOAGULATED: ICD-10-CM

## 2021-04-22 DIAGNOSIS — Z79.899 HIGH RISK MEDICATION USE: ICD-10-CM

## 2021-04-22 DIAGNOSIS — Z00.00 ANNUAL PHYSICAL EXAM: ICD-10-CM

## 2021-04-22 LAB
ALBUMIN SERPL BCP-MCNC: 4.1 G/DL (ref 3.2–4.9)
ALBUMIN/GLOB SERPL: 1.2 G/DL
ALP SERPL-CCNC: 58 U/L (ref 30–99)
ALT SERPL-CCNC: 9 U/L (ref 2–50)
ANION GAP SERPL CALC-SCNC: 12 MMOL/L (ref 7–16)
AST SERPL-CCNC: 15 U/L (ref 12–45)
BASOPHILS # BLD AUTO: 0.6 % (ref 0–1.8)
BASOPHILS # BLD: 0.03 K/UL (ref 0–0.12)
BILIRUB SERPL-MCNC: 0.9 MG/DL (ref 0.1–1.5)
BUN SERPL-MCNC: 20 MG/DL (ref 8–22)
CALCIUM SERPL-MCNC: 8.9 MG/DL (ref 8.4–10.2)
CHLORIDE SERPL-SCNC: 104 MMOL/L (ref 96–112)
CHOLEST SERPL-MCNC: 150 MG/DL (ref 100–199)
CO2 SERPL-SCNC: 24 MMOL/L (ref 20–33)
CREAT SERPL-MCNC: 1.25 MG/DL (ref 0.5–1.4)
EOSINOPHIL # BLD AUTO: 0.35 K/UL (ref 0–0.51)
EOSINOPHIL NFR BLD: 7 % (ref 0–6.9)
ERYTHROCYTE [DISTWIDTH] IN BLOOD BY AUTOMATED COUNT: 47.2 FL (ref 35.9–50)
FASTING STATUS PATIENT QL REPORTED: NORMAL
GLOBULIN SER CALC-MCNC: 3.3 G/DL (ref 1.9–3.5)
GLUCOSE SERPL-MCNC: 100 MG/DL (ref 65–99)
HCT VFR BLD AUTO: 43.1 % (ref 42–52)
HDLC SERPL-MCNC: 64 MG/DL
HGB BLD-MCNC: 13.8 G/DL (ref 14–18)
IMM GRANULOCYTES # BLD AUTO: 0.02 K/UL (ref 0–0.11)
IMM GRANULOCYTES NFR BLD AUTO: 0.4 % (ref 0–0.9)
LDLC SERPL CALC-MCNC: 78 MG/DL
LYMPHOCYTES # BLD AUTO: 1.45 K/UL (ref 1–4.8)
LYMPHOCYTES NFR BLD: 29.1 % (ref 22–41)
MCH RBC QN AUTO: 29.3 PG (ref 27–33)
MCHC RBC AUTO-ENTMCNC: 32 G/DL (ref 33.7–35.3)
MCV RBC AUTO: 91.5 FL (ref 81.4–97.8)
MONOCYTES # BLD AUTO: 0.65 K/UL (ref 0–0.85)
MONOCYTES NFR BLD AUTO: 13.1 % (ref 0–13.4)
NEUTROPHILS # BLD AUTO: 2.48 K/UL (ref 1.82–7.42)
NEUTROPHILS NFR BLD: 49.8 % (ref 44–72)
NRBC # BLD AUTO: 0 K/UL
NRBC BLD-RTO: 0 /100 WBC
PLATELET # BLD AUTO: 184 K/UL (ref 164–446)
PMV BLD AUTO: 10 FL (ref 9–12.9)
POTASSIUM SERPL-SCNC: 4.6 MMOL/L (ref 3.6–5.5)
PROT SERPL-MCNC: 7.4 G/DL (ref 6–8.2)
RBC # BLD AUTO: 4.71 M/UL (ref 4.7–6.1)
SODIUM SERPL-SCNC: 140 MMOL/L (ref 135–145)
TRIGL SERPL-MCNC: 42 MG/DL (ref 0–149)
TSH SERPL DL<=0.005 MIU/L-ACNC: 4.3 UIU/ML (ref 0.38–5.33)
WBC # BLD AUTO: 5 K/UL (ref 4.8–10.8)

## 2021-04-22 PROCEDURE — 80053 COMPREHEN METABOLIC PANEL: CPT

## 2021-04-22 PROCEDURE — 36415 COLL VENOUS BLD VENIPUNCTURE: CPT

## 2021-04-22 PROCEDURE — 80061 LIPID PANEL: CPT

## 2021-04-22 PROCEDURE — 84443 ASSAY THYROID STIM HORMONE: CPT

## 2021-04-22 PROCEDURE — 85025 COMPLETE CBC W/AUTO DIFF WBC: CPT

## 2021-06-22 ENCOUNTER — OFFICE VISIT (OUTPATIENT)
Dept: CARDIOLOGY | Facility: MEDICAL CENTER | Age: 86
End: 2021-06-22
Payer: COMMERCIAL

## 2021-06-22 VITALS
HEIGHT: 67 IN | OXYGEN SATURATION: 98 % | RESPIRATION RATE: 18 BRPM | SYSTOLIC BLOOD PRESSURE: 126 MMHG | WEIGHT: 141 LBS | HEART RATE: 96 BPM | DIASTOLIC BLOOD PRESSURE: 80 MMHG | BODY MASS INDEX: 22.13 KG/M2

## 2021-06-22 DIAGNOSIS — Z79.01 ANTICOAGULATED: ICD-10-CM

## 2021-06-22 DIAGNOSIS — Z98.890 S/P ABLATION OF ATRIAL FLUTTER: ICD-10-CM

## 2021-06-22 DIAGNOSIS — I48.3 TYPICAL ATRIAL FLUTTER (HCC): Chronic | ICD-10-CM

## 2021-06-22 DIAGNOSIS — Z86.79 S/P ABLATION OF ATRIAL FLUTTER: ICD-10-CM

## 2021-06-22 DIAGNOSIS — Z95.0 PRESENCE OF PERMANENT CARDIAC PACEMAKER: Chronic | ICD-10-CM

## 2021-06-22 DIAGNOSIS — G45.9 TIA (TRANSIENT ISCHEMIC ATTACK): Chronic | ICD-10-CM

## 2021-06-22 DIAGNOSIS — I48.19 OTHER PERSISTENT ATRIAL FIBRILLATION (HCC): ICD-10-CM

## 2021-06-22 DIAGNOSIS — I49.5 SSS (SICK SINUS SYNDROME) (HCC): Chronic | ICD-10-CM

## 2021-06-22 DIAGNOSIS — I48.0 PAF (PAROXYSMAL ATRIAL FIBRILLATION) (HCC): ICD-10-CM

## 2021-06-22 DIAGNOSIS — L30.9 DERMATITIS: ICD-10-CM

## 2021-06-22 PROCEDURE — 93280 PM DEVICE PROGR EVAL DUAL: CPT | Performed by: INTERNAL MEDICINE

## 2021-06-22 PROCEDURE — 99214 OFFICE O/P EST MOD 30 MIN: CPT | Mod: 25 | Performed by: INTERNAL MEDICINE

## 2021-06-22 ASSESSMENT — FIBROSIS 4 INDEX: FIB4 SCORE: 2.47

## 2021-06-22 NOTE — PROGRESS NOTES
Chief Complaint   Patient presents with   • Atrial Flutter       Subjective:   Abhilash Rios is a 89 y.o. male who presents today with history of atrial arrhythmia status post flutter ablation in the past.  Maintaining sinus rhythm minimal atrial arrhythmias.  On Eliquis.  Doing well.  Still independent.  Accompanied by his brother.  No chest pain or shortness of breath.  Lives alone.  Does not drive at this time.  No hospitalizations.    Past Medical History:   Diagnosis Date   • Atrial flutter (HCC) 3/8/2012   • Dizziness 3/8/2012   • Macular degeneration 3/8/2012   • Pacemaker    • Pre-syncope 3/8/2012   • Presence of permanent cardiac pacemaker 3/8/2012   • SSS (sick sinus syndrome) (HCC) 3/8/2012   • TIA (transient ischemic attack) 3/8/2012   • Vertigo 3/8/2012     Past Surgical History:   Procedure Laterality Date   • EYE SURGERY      09/02/09 Status post eye surgery for macular degeneration.   • OTHER ORTHOPEDIC SURGERY      Lower Back Surgery   • ROTATOR CUFF REPAIR      09/02/09 Bilateral.   • TRANS URETHRAL RESECTION PROSTATE       Family History   Problem Relation Age of Onset   • Non-contributory Other      Social History     Socioeconomic History   • Marital status: Single     Spouse name: Not on file   • Number of children: Not on file   • Years of education: Not on file   • Highest education level: Not on file   Occupational History   • Not on file   Tobacco Use   • Smoking status: Never Smoker   • Smokeless tobacco: Never Used   Vaping Use   • Vaping Use: Never used   Substance and Sexual Activity   • Alcohol use: No   • Drug use: No   • Sexual activity: Not on file   Other Topics Concern   • Not on file   Social History Narrative   • Not on file     Social Determinants of Health     Financial Resource Strain:    • Difficulty of Paying Living Expenses:    Food Insecurity:    • Worried About Running Out of Food in the Last Year:    • Ran Out of Food in the Last Year:    Transportation Needs:    •  "Lack of Transportation (Medical):    • Lack of Transportation (Non-Medical):    Physical Activity:    • Days of Exercise per Week:    • Minutes of Exercise per Session:    Stress:    • Feeling of Stress :    Social Connections:    • Frequency of Communication with Friends and Family:    • Frequency of Social Gatherings with Friends and Family:    • Attends Yazdanism Services:    • Active Member of Clubs or Organizations:    • Attends Club or Organization Meetings:    • Marital Status:    Intimate Partner Violence:    • Fear of Current or Ex-Partner:    • Emotionally Abused:    • Physically Abused:    • Sexually Abused:      Allergies   Allergen Reactions   • Sulfa Drugs Anaphylaxis   • Sulfasalazine Anaphylaxis     Outpatient Encounter Medications as of 6/22/2021   Medication Sig Dispense Refill   • apixaban (ELIQUIS) 2.5mg Tab Take 1 tablet by mouth 2 Times a Day. 180 tablet 1   • Cholecalciferol (D3-1000) 1000 UNIT Cap Take 1 Cap by mouth.     • [DISCONTINUED] Ascorbic Acid (VITAMIN C PO) Take 1 Dose by mouth every day. (Patient not taking: Reported on 6/22/2021)       No facility-administered encounter medications on file as of 6/22/2021.     ROS     Objective:   /80 (BP Location: Left arm, Patient Position: Sitting, BP Cuff Size: Adult)   Pulse 96   Resp 18   Ht 1.702 m (5' 7\")   Wt 64 kg (141 lb)   SpO2 98%   BMI 22.08 kg/m²     Physical Exam   Constitutional: He is oriented to person, place, and time. He appears well-developed.   HENT:   Head: Normocephalic and atraumatic.   Cardiovascular: Normal rate and regular rhythm. Exam reveals no gallop and no friction rub.   No murmur heard.  Pulmonary/Chest: Effort normal and breath sounds normal.   Abdominal: Soft.   Musculoskeletal:         General: Normal range of motion.      Cervical back: Normal range of motion and neck supple.   Neurological: He is alert and oriented to person, place, and time.   Skin: Skin is warm and dry.   Psychiatric: His " behavior is normal. Judgment and thought content normal.       Assessment:     1. SSS (sick sinus syndrome) ([facility])     2. TIA (transient ischemic attack)     3. S/P ablation of atrial flutter     4. Presence of permanent cardiac pacemaker     5. PAF (paroxysmal atrial fibrillation) ([facility])     6. Other persistent atrial fibrillation ([facility])     7. Typical atrial flutter ([facility])     8. Anticoagulated     9. Dermatitis  REFERRAL TO DERMATOLOGY       Medical Decision Making:  Today's Assessment / Status / Plan:   1.  Sick sinus syndrome status post permanent pacemaker, normal function.  2.  Atrial arrhythmias minimal.  3.  Anticoagulation continue.  4.  Recent labs unremarkable.  5.  Follow-up in 1 year.

## 2021-08-25 ENCOUNTER — TELEPHONE (OUTPATIENT)
Dept: CARDIOLOGY | Facility: MEDICAL CENTER | Age: 86
End: 2021-08-25

## 2021-08-25 NOTE — TELEPHONE ENCOUNTER
Tierra,    I recvd a voice message from Fuentes (patient's brother) requesting a call to discuss the monitor they recvd in the mail from [employer]. Can you please call him to discuss?     [phone]    Thank You,  Belkys

## 2021-08-27 NOTE — TELEPHONE ENCOUNTER
Returned call to patient brother--all questions answered. They will set up monitor and will contact office with any further questions.

## 2021-10-11 DIAGNOSIS — I48.0 PAF (PAROXYSMAL ATRIAL FIBRILLATION) (HCC): ICD-10-CM

## 2021-10-11 DIAGNOSIS — I48.3 TYPICAL ATRIAL FLUTTER (HCC): ICD-10-CM

## 2021-10-12 RX ORDER — APIXABAN 2.5 MG/1
TABLET, FILM COATED ORAL
Qty: 180 TABLET | Refills: 3 | Status: SHIPPED | OUTPATIENT
Start: 2021-10-12 | End: 2022-01-01 | Stop reason: SDUPTHER

## 2021-12-22 ENCOUNTER — TELEPHONE (OUTPATIENT)
Dept: CARDIOLOGY | Facility: MEDICAL CENTER | Age: 86
End: 2021-12-22

## 2021-12-22 ENCOUNTER — DEVICE CHECK (OUTPATIENT)
Dept: CARDIOLOGY | Facility: MEDICAL CENTER | Age: 86
End: 2021-12-22

## 2021-12-22 NOTE — TELEPHONE ENCOUNTER
"Called pt's brother Fuentes. He expressed recent concerns. He stated, \"He started feeling fatigued about a month ago, we took him to urgent care because he's been complaining of a cough, his COVID test was negative, and they ran a whole series of tests, all results were fine.\"    Pt went to PCP Dr. Avila a week ago and \"he couldn't find anything wrong with him. He said get an ECHO for follow up.\" Pt scheduled for ECHO at [facility] on 01/07/22 which was the soonest he can schedule.    Per Fuentes, currently, pt has \"improved really, if it's up to him he'd be out everyday. We went out and had lunch, he's fine, but he coughs a little bit and he's always tired, Dr. Avila said this could just be another virus caught with the flu.\" Per Fuentes, he's inquiring if there are any recommendations.    Pt has home monitor device and advised Fuentes that pt should send a transmission for further evaluation. Will confirm with DS if any new recommendations for pt. He verbalized understanding and was appreciative.    To Dr. Godinez - pt's had unresolved fatigue the past month, been evaluated by PCP and tests were negative. ECHO scheduled for 01/07/22. Please advise if other recommendations at this time. Thank you!  "

## 2021-12-22 NOTE — TELEPHONE ENCOUNTER
DS    Pt's brother, Fuentes called and states that the pt has been feeling very fatigue lately. They went to see PCP and an Echo was ordered but otherwise PCP states everything else looks good. Fuentes states that he just wants to let DS know and also talk to the provider or RN to further discuss situation and get some recommendations at [phone].    Thank you

## 2021-12-23 NOTE — TELEPHONE ENCOUNTER
Durga Godinez M.D. routed conversation to You 45 minutes ago (3:21 PM)     Durga Godinez M.D. 45 minutes ago (3:21 PM)      Yes, but can come in for a pacer check earlier     You  Durga Godinez M.D. 52 minutes ago (3:14 PM)       Dr. Godinez, soonest available with you and [facility] would be 01/10/22, ok to wait till then? Will advise ER precautions mean time. Please confirm. Thank you!      Discussed with Tierra. Remote transmission received from today, showed no significant changes from previous transmission. She will notify DS. Called back pt's brother Fuentes. Advised soonest available FV with DS 1/10/22 and scheduled pt. Confirmed date and time. Pt's ECHO at [facility] is on 01/07/22 at 12:10pm. Advised will request results after test and he should call our office if pt is able to schedule a sooner date. Advised to call if any questions/concerns or send transmission from home prior to FV. He verbalized understanding and was appreciative.

## 2022-01-01 ENCOUNTER — OFFICE VISIT (OUTPATIENT)
Dept: CARDIOLOGY | Facility: MEDICAL CENTER | Age: 87
End: 2022-01-01
Payer: COMMERCIAL

## 2022-01-01 ENCOUNTER — NON-PROVIDER VISIT (OUTPATIENT)
Dept: CARDIOLOGY | Facility: MEDICAL CENTER | Age: 87
End: 2022-01-01
Payer: COMMERCIAL

## 2022-01-01 VITALS
DIASTOLIC BLOOD PRESSURE: 64 MMHG | HEART RATE: 76 BPM | BODY MASS INDEX: 19.93 KG/M2 | RESPIRATION RATE: 18 BRPM | OXYGEN SATURATION: 98 % | SYSTOLIC BLOOD PRESSURE: 102 MMHG | WEIGHT: 124 LBS | HEIGHT: 66 IN

## 2022-01-01 DIAGNOSIS — Z98.890 S/P ABLATION OF ATRIAL FLUTTER: ICD-10-CM

## 2022-01-01 DIAGNOSIS — I44.2 COMPLETE HEART BLOCK (HCC): ICD-10-CM

## 2022-01-01 DIAGNOSIS — I48.3 TYPICAL ATRIAL FLUTTER (HCC): ICD-10-CM

## 2022-01-01 DIAGNOSIS — G45.9 TIA (TRANSIENT ISCHEMIC ATTACK): Chronic | ICD-10-CM

## 2022-01-01 DIAGNOSIS — Z86.79 S/P ABLATION OF ATRIAL FLUTTER: ICD-10-CM

## 2022-01-01 DIAGNOSIS — I49.5 SSS (SICK SINUS SYNDROME) (HCC): Chronic | ICD-10-CM

## 2022-01-01 DIAGNOSIS — Z79.01 ANTICOAGULATED: ICD-10-CM

## 2022-01-01 DIAGNOSIS — I47.29 NSVT (NONSUSTAINED VENTRICULAR TACHYCARDIA) (HCC): ICD-10-CM

## 2022-01-01 DIAGNOSIS — Z95.0 PRESENCE OF PERMANENT CARDIAC PACEMAKER: Chronic | ICD-10-CM

## 2022-01-01 DIAGNOSIS — I48.0 PAF (PAROXYSMAL ATRIAL FIBRILLATION) (HCC): ICD-10-CM

## 2022-01-01 PROCEDURE — 93294 REM INTERROG EVL PM/LDLS PM: CPT | Performed by: INTERNAL MEDICINE

## 2022-01-01 PROCEDURE — 93280 PM DEVICE PROGR EVAL DUAL: CPT | Performed by: INTERNAL MEDICINE

## 2022-01-01 PROCEDURE — 99214 OFFICE O/P EST MOD 30 MIN: CPT | Mod: 25 | Performed by: INTERNAL MEDICINE

## 2022-01-01 ASSESSMENT — FIBROSIS 4 INDEX: FIB4 SCORE: 2.53

## 2022-01-07 NOTE — TELEPHONE ENCOUNTER
Called [facility] and spoke with medical records. Echo results are not yet finalized. Advised pt's FV is on Monday 01/10/22 at 10:40am. They will send to nurse's station fax as soon as finalized on Monday, confirmed return fax number. Will await results on Monday.    Muut  [phone] P

## 2022-01-10 ENCOUNTER — OFFICE VISIT (OUTPATIENT)
Dept: CARDIOLOGY | Facility: MEDICAL CENTER | Age: 87
End: 2022-01-10
Payer: COMMERCIAL

## 2022-01-10 ENCOUNTER — TELEPHONE (OUTPATIENT)
Dept: CARDIOLOGY | Facility: MEDICAL CENTER | Age: 87
End: 2022-01-10

## 2022-01-10 VITALS
HEART RATE: 92 BPM | HEIGHT: 66 IN | DIASTOLIC BLOOD PRESSURE: 82 MMHG | OXYGEN SATURATION: 98 % | BODY MASS INDEX: 21.86 KG/M2 | RESPIRATION RATE: 14 BRPM | SYSTOLIC BLOOD PRESSURE: 120 MMHG | WEIGHT: 136 LBS

## 2022-01-10 DIAGNOSIS — G45.9 TIA (TRANSIENT ISCHEMIC ATTACK): Chronic | ICD-10-CM

## 2022-01-10 DIAGNOSIS — I48.19 OTHER PERSISTENT ATRIAL FIBRILLATION (HCC): ICD-10-CM

## 2022-01-10 DIAGNOSIS — I48.0 PAF (PAROXYSMAL ATRIAL FIBRILLATION) (HCC): ICD-10-CM

## 2022-01-10 DIAGNOSIS — Z79.01 ANTICOAGULATED: ICD-10-CM

## 2022-01-10 DIAGNOSIS — I49.5 SSS (SICK SINUS SYNDROME) (HCC): Chronic | ICD-10-CM

## 2022-01-10 PROCEDURE — 93280 PM DEVICE PROGR EVAL DUAL: CPT | Performed by: INTERNAL MEDICINE

## 2022-01-10 PROCEDURE — 99214 OFFICE O/P EST MOD 30 MIN: CPT | Mod: 25 | Performed by: INTERNAL MEDICINE

## 2022-01-10 RX ORDER — TRIAMCINOLONE ACETONIDE 1 MG/G
CREAM TOPICAL
COMMUNITY
Start: 2021-12-01 | End: 2023-01-01

## 2022-01-10 ASSESSMENT — FIBROSIS 4 INDEX: FIB4 SCORE: 2.5

## 2022-01-10 NOTE — PROGRESS NOTES
Chief Complaint   Patient presents with   • Atrial Fibrillation     F/V Dx: Other persistent atrial fibrillation (HCC)       Subjective     Abhilash Rios is a 89 y.o. male who presents today with increased fatigue recently.  Possible cold back in December.  Went to [facility] urgent care.  Labs normal.  Echocardiogram with low normal EF.  I did review it probably looks in the 40s as opposed to 50 on the EF.  Paces 100% in the ventricle.  States he feels somewhat better than December.    Past Medical History:   Diagnosis Date   • Atrial flutter (HCC) 3/8/2012   • Dizziness 3/8/2012   • Macular degeneration 3/8/2012   • Pacemaker    • Pre-syncope 3/8/2012   • Presence of permanent cardiac pacemaker 3/8/2012   • SSS (sick sinus syndrome) (HCC) 3/8/2012   • TIA (transient ischemic attack) 3/8/2012   • Vertigo 3/8/2012     Past Surgical History:   Procedure Laterality Date   • EYE SURGERY      09/02/09 Status post eye surgery for macular degeneration.   • OTHER ORTHOPEDIC SURGERY      Lower Back Surgery   • ROTATOR CUFF REPAIR      09/02/09 Bilateral.   • TRANS URETHRAL RESECTION PROSTATE       Family History   Problem Relation Age of Onset   • Non-contributory Other      Social History     Socioeconomic History   • Marital status: Single     Spouse name: Not on file   • Number of children: Not on file   • Years of education: Not on file   • Highest education level: Not on file   Occupational History   • Not on file   Tobacco Use   • Smoking status: Never Smoker   • Smokeless tobacco: Never Used   Vaping Use   • Vaping Use: Never used   Substance and Sexual Activity   • Alcohol use: No   • Drug use: No   • Sexual activity: Not on file   Other Topics Concern   • Not on file   Social History Narrative   • Not on file     Social Determinants of Health     Financial Resource Strain:    • Difficulty of Paying Living Expenses: Not on file   Food Insecurity:    • Worried About Running Out of Food in the Last Year: Not on file  "  • Ran Out of Food in the Last Year: Not on file   Transportation Needs:    • Lack of Transportation (Medical): Not on file   • Lack of Transportation (Non-Medical): Not on file   Physical Activity:    • Days of Exercise per Week: Not on file   • Minutes of Exercise per Session: Not on file   Stress:    • Feeling of Stress : Not on file   Social Connections:    • Frequency of Communication with Friends and Family: Not on file   • Frequency of Social Gatherings with Friends and Family: Not on file   • Attends Gnosticist Services: Not on file   • Active Member of Clubs or Organizations: Not on file   • Attends Club or Organization Meetings: Not on file   • Marital Status: Not on file   Intimate Partner Violence:    • Fear of Current or Ex-Partner: Not on file   • Emotionally Abused: Not on file   • Physically Abused: Not on file   • Sexually Abused: Not on file   Housing Stability:    • Unable to Pay for Housing in the Last Year: Not on file   • Number of Places Lived in the Last Year: Not on file   • Unstable Housing in the Last Year: Not on file     Allergies   Allergen Reactions   • Sulfa Drugs Anaphylaxis   • Sulfasalazine Anaphylaxis     Outpatient Encounter Medications as of 1/10/2022   Medication Sig Dispense Refill   • triamcinolone acetonide (KENALOG) 0.1 % Cream      • ELIQUIS 2.5 MG Tab TAKE ONE TABLET BY MOUTH TWICE DAILY 180 Tablet 3   • Cholecalciferol (D3-1000) 1000 UNIT Cap Take 1 Cap by mouth.       No facility-administered encounter medications on file as of 1/10/2022.     ROS           Objective     /82 (BP Location: Left arm, Patient Position: Sitting, BP Cuff Size: Adult)   Pulse 92   Resp 14   Ht 1.676 m (5' 6\")   Wt 61.7 kg (136 lb)   SpO2 98%   BMI 21.95 kg/m²     Physical Exam  Constitutional:       Appearance: He is well-developed.   HENT:      Head: Normocephalic and atraumatic.   Cardiovascular:      Rate and Rhythm: Normal rate and regular rhythm.      Heart sounds: No murmur " heard.  No friction rub. No gallop.    Pulmonary:      Effort: Pulmonary effort is normal.      Breath sounds: Normal breath sounds.   Abdominal:      Palpations: Abdomen is soft.   Musculoskeletal:         General: Normal range of motion.      Cervical back: Normal range of motion and neck supple.   Skin:     General: Skin is warm and dry.   Neurological:      Mental Status: He is alert and oriented to person, place, and time.   Psychiatric:         Behavior: Behavior normal.         Thought Content: Thought content normal.         Judgment: Judgment normal.                Assessment & Plan     1. SSS (sick sinus syndrome) ([facility])     2. PAF (paroxysmal atrial fibrillation) ([facility])     3. Other persistent atrial fibrillation ([facility])     4. TIA (transient ischemic attack)     5. Anticoagulated         Medical Decision Making: Today's Assessment/Status/Plan:   1.  Pacing with some decreased LV function.  Possibly related to dyssynchrony.  Did discuss upgrade to CRT-P they will think about it.  2.  Anticoagulation on low-dose Eliquis.  3.  Get labs from urgent care in December.  4.  Follow-up in 6 weeks  5. Atrial arrhythmias minimal changes from previous burden.

## 2022-01-10 NOTE — TELEPHONE ENCOUNTER
Medical records request sent to:    [facility]  Medical Records  [phone] P  [phone] F    Received receipt for confirmation. Will await response.

## 2022-01-14 NOTE — TELEPHONE ENCOUNTER
Received records from [facility] only for chest xray findings on 12/10/21 (scanned in media). Called medical records [phone], no option to leave message. Sent another request specifically for any visit notes and lab work during urgent care visit in December. Received receipt for confirmation. Will await response.

## 2022-02-22 ENCOUNTER — OFFICE VISIT (OUTPATIENT)
Dept: CARDIOLOGY | Facility: MEDICAL CENTER | Age: 87
End: 2022-02-22
Payer: COMMERCIAL

## 2022-02-22 VITALS
RESPIRATION RATE: 14 BRPM | OXYGEN SATURATION: 96 % | HEIGHT: 66 IN | WEIGHT: 140 LBS | HEART RATE: 92 BPM | DIASTOLIC BLOOD PRESSURE: 68 MMHG | SYSTOLIC BLOOD PRESSURE: 110 MMHG | BODY MASS INDEX: 22.5 KG/M2

## 2022-02-22 DIAGNOSIS — I47.29 NSVT (NONSUSTAINED VENTRICULAR TACHYCARDIA) (HCC): ICD-10-CM

## 2022-02-22 DIAGNOSIS — Z86.79 S/P ABLATION OF ATRIAL FLUTTER: ICD-10-CM

## 2022-02-22 DIAGNOSIS — Z79.01 ANTICOAGULATED: ICD-10-CM

## 2022-02-22 DIAGNOSIS — I44.2 COMPLETE HEART BLOCK (HCC): ICD-10-CM

## 2022-02-22 DIAGNOSIS — Z95.0 PRESENCE OF PERMANENT CARDIAC PACEMAKER: Chronic | ICD-10-CM

## 2022-02-22 DIAGNOSIS — G45.9 TIA (TRANSIENT ISCHEMIC ATTACK): Chronic | ICD-10-CM

## 2022-02-22 DIAGNOSIS — Z98.890 S/P ABLATION OF ATRIAL FLUTTER: ICD-10-CM

## 2022-02-22 DIAGNOSIS — I49.5 SSS (SICK SINUS SYNDROME) (HCC): Chronic | ICD-10-CM

## 2022-02-22 DIAGNOSIS — I48.19 OTHER PERSISTENT ATRIAL FIBRILLATION (HCC): ICD-10-CM

## 2022-02-22 DIAGNOSIS — I48.0 PAF (PAROXYSMAL ATRIAL FIBRILLATION) (HCC): ICD-10-CM

## 2022-02-22 PROCEDURE — 93280 PM DEVICE PROGR EVAL DUAL: CPT | Performed by: INTERNAL MEDICINE

## 2022-02-22 PROCEDURE — 99214 OFFICE O/P EST MOD 30 MIN: CPT | Mod: 25 | Performed by: INTERNAL MEDICINE

## 2022-02-22 ASSESSMENT — FIBROSIS 4 INDEX: FIB4 SCORE: 2.5

## 2022-02-22 NOTE — PROGRESS NOTES
Chief Complaint   Patient presents with   • Atrial Flutter     F/V Dx: Typical atrial flutter (HCC) & SSS (sick sinus syndrome) ([facility])       Subjective     Abhilash Rios is a 89 y.o. male who presents today with history of complete heart block status post permanent pacemaker.  Paroxysmal atrial fibrillation on anticoagulation.  Back to baseline.  No real shortness of breath.    Past Medical History:   Diagnosis Date   • Atrial flutter (HCC) 3/8/2012   • Dizziness 3/8/2012   • Macular degeneration 3/8/2012   • Pacemaker    • Pre-syncope 3/8/2012   • Presence of permanent cardiac pacemaker 3/8/2012   • SSS (sick sinus syndrome) ([facility]) 3/8/2012   • TIA (transient ischemic attack) 3/8/2012   • Vertigo 3/8/2012     Past Surgical History:   Procedure Laterality Date   • EYE SURGERY      09/02/09 Status post eye surgery for macular degeneration.   • OTHER ORTHOPEDIC SURGERY      Lower Back Surgery   • ROTATOR CUFF REPAIR      09/02/09 Bilateral.   • TRANS URETHRAL RESECTION PROSTATE       Family History   Problem Relation Age of Onset   • Non-contributory Other      Social History     Socioeconomic History   • Marital status: Single     Spouse name: Not on file   • Number of children: Not on file   • Years of education: Not on file   • Highest education level: Not on file   Occupational History   • Not on file   Tobacco Use   • Smoking status: Never Smoker   • Smokeless tobacco: Never Used   Vaping Use   • Vaping Use: Never used   Substance and Sexual Activity   • Alcohol use: No   • Drug use: No   • Sexual activity: Not on file   Other Topics Concern   • Not on file   Social History Narrative   • Not on file     Social Determinants of Health     Financial Resource Strain: Not on file   Food Insecurity: Not on file   Transportation Needs: Not on file   Physical Activity: Not on file   Stress: Not on file   Social Connections: Not on file   Intimate Partner Violence: Not on file   Housing Stability: Not on file  "    Allergies   Allergen Reactions   • Sulfa Drugs Anaphylaxis   • Sulfasalazine Anaphylaxis     Outpatient Encounter Medications as of 2/22/2022   Medication Sig Dispense Refill   • triamcinolone acetonide (KENALOG) 0.1 % Cream      • ELIQUIS 2.5 MG Tab TAKE ONE TABLET BY MOUTH TWICE DAILY 180 Tablet 3   • Cholecalciferol 1000 UNIT Cap Take 1 Cap by mouth.       No facility-administered encounter medications on file as of 2/22/2022.     ROS           Objective     /68 (BP Location: Left arm, Patient Position: Sitting, BP Cuff Size: Adult)   Pulse 92   Resp 14   Ht 1.676 m (5' 6\")   Wt 63.5 kg (140 lb)   SpO2 96%   BMI 22.60 kg/m²     Physical Exam  Constitutional:       Appearance: He is well-developed.   HENT:      Head: Normocephalic and atraumatic.   Cardiovascular:      Rate and Rhythm: Normal rate and regular rhythm.      Heart sounds: No murmur heard.    No friction rub. No gallop.   Pulmonary:      Effort: Pulmonary effort is normal.      Breath sounds: Normal breath sounds.   Abdominal:      Palpations: Abdomen is soft.   Musculoskeletal:         General: Normal range of motion.      Cervical back: Normal range of motion and neck supple.   Skin:     General: Skin is warm and dry.   Neurological:      Mental Status: He is alert and oriented to person, place, and time.   Psychiatric:         Behavior: Behavior normal.         Thought Content: Thought content normal.         Judgment: Judgment normal.                Assessment & Plan     1. NSVT (nonsustained ventricular tachycardia) ([facility])     2. Other persistent atrial fibrillation ([facility])     3. Anticoagulated     4. PAF (paroxysmal atrial fibrillation) ([facility])     5. S/P ablation of atrial flutter     6. TIA (transient ischemic attack)     7. SSS (sick sinus syndrome) ([facility])     8. Presence of permanent cardiac pacemaker     9. Complete heart block ([facility])         Medical Decision Making: Today's Assessment/Status/Plan:   1.  Permanent pacemaker for " complete heart block.  Stable no indication for upgrade at this time.  2.  Anticoagulation continue.  3.  Follow-up in 6 months.

## 2022-05-27 ENCOUNTER — NON-PROVIDER VISIT (OUTPATIENT)
Dept: CARDIOLOGY | Facility: MEDICAL CENTER | Age: 87
End: 2022-05-27
Payer: COMMERCIAL

## 2022-05-27 PROCEDURE — 93294 REM INTERROG EVL PM/LDLS PM: CPT | Performed by: INTERNAL MEDICINE

## 2022-06-01 NOTE — CARDIAC REMOTE MONITOR - SCAN
Device transmission reviewed. Device demonstrated appropriate function.       Electronically Signed by: Jonathan Patel M.D.    6/2/2022  2:39 PM

## 2022-08-26 ENCOUNTER — TELEPHONE (OUTPATIENT)
Dept: CARDIOLOGY | Facility: MEDICAL CENTER | Age: 87
End: 2022-08-26
Payer: COMMERCIAL

## 2022-08-26 ENCOUNTER — NON-PROVIDER VISIT (OUTPATIENT)
Dept: CARDIOLOGY | Facility: MEDICAL CENTER | Age: 87
End: 2022-08-26
Payer: COMMERCIAL

## 2022-08-26 PROCEDURE — 93294 REM INTERROG EVL PM/LDLS PM: CPT | Performed by: INTERNAL MEDICINE

## 2022-08-26 NOTE — TELEPHONE ENCOUNTER
Patient transmitted 08/26/2022    Pt presenting in persistent AFL (V-Rate: 80's bpm).    AT/AF [place] 100%     Pt on Eliquis.    To be scanned into media.

## 2022-08-26 NOTE — TELEPHONE ENCOUNTER
"OSIEL Ruggiero.  Oly Luna, [facility] Ass't 25 minutes ago (4:17 PM)     Lets see how he is feeling. If he is feeling poorly I would like to escalate this to Dr. Patel or Dr. Duval due to his advanced age.     JS      Called pt, he said he \"doesn't feel anything differently. For 15 years I only took one medication for blood pressure. After I fell, I went to several doctors and none of them felt I needed other medications. I think I'm doing ok for 89 years old.\" Pt denied symptoms like CP, SOB, palpitations. Advised will relay information to JS and give him a call if any new recommendations. He verbalized understanding and was very appreciative of call.  "

## 2022-08-26 NOTE — CARDIAC REMOTE MONITOR - SCAN
Device transmission reviewed. Device demonstrated appropriate function.       Electronically Signed by: Jonathan Patel M.D.    9/2/2022  3:26 PM

## 2022-09-02 NOTE — TELEPHONE ENCOUNTER
OSEIL Ruggiero.  You 4 minutes ago (2:17 PM)     I just called and spoke with patient. He continues to have no symptoms. We discussed rate versus rhythm control. Patient is currently rate controlled without medication. Patient is not interested in rhythm control. Patient will keep follow up with Dr. Godinez in November. Patient asked for me to call his brother Fuentes to keep him updated as well. There was no answer and I left a message. If he calls back would you please let Fuentes know that his brother is in rate controlled AFib and is on OAC to protect him from stroke and that his brother has declined any treatment to try and get him back into normal rhythm.     Jennifer       Noted above response from JS. Will await call back from pt's brother Fuentes to relay him the message from REY.

## 2022-11-08 ENCOUNTER — PATIENT MESSAGE (OUTPATIENT)
Dept: HEALTH INFORMATION MANAGEMENT | Facility: OTHER | Age: 87
End: 2022-11-08

## 2022-11-28 NOTE — CARDIAC REMOTE MONITOR - SCAN
Device transmission reviewed. Device demonstrated appropriate function.       Electronically Signed by: Jonathan Patel M.D.    11/30/2022  2:44 PM

## 2022-12-12 NOTE — PROGRESS NOTES
Chief Complaint   Patient presents with    Atrial Fibrillation     10 month F/V Dx: NSVT       Subjective     Abhilash Rios is a 89 y.o. male who presents today with recurrent atrial arrhythmias asymptomatic.  In atrial flutter today.  History of complete heart block.  On Eliquis.  Stable.  Mild worsening of his memory.    Past Medical History:   Diagnosis Date    Atrial flutter (HCC) 3/8/2012    Dizziness 3/8/2012    Macular degeneration 3/8/2012    Pacemaker     Pre-syncope 3/8/2012    Presence of permanent cardiac pacemaker 3/8/2012    SSS (sick sinus syndrome) (HCC) 3/8/2012    TIA (transient ischemic attack) 3/8/2012    Vertigo 3/8/2012     Past Surgical History:   Procedure Laterality Date    EYE SURGERY      09/02/09 Status post eye surgery for macular degeneration.    OTHER ORTHOPEDIC SURGERY      Lower Back Surgery    ROTATOR CUFF REPAIR      09/02/09 Bilateral.    TRANS URETHRAL RESECTION PROSTATE       Family History   Problem Relation Age of Onset    Non-contributory Other      Social History     Socioeconomic History    Marital status: Single     Spouse name: Not on file    Number of children: Not on file    Years of education: Not on file    Highest education level: Not on file   Occupational History    Not on file   Tobacco Use    Smoking status: Never    Smokeless tobacco: Never   Vaping Use    Vaping Use: Never used   Substance and Sexual Activity    Alcohol use: No    Drug use: No    Sexual activity: Not on file   Other Topics Concern    Not on file   Social History Narrative    Not on file     Social Determinants of Health     Financial Resource Strain: Not on file   Food Insecurity: Not on file   Transportation Needs: Not on file   Physical Activity: Not on file   Stress: Not on file   Social Connections: Not on file   Intimate Partner Violence: Not on file   Housing Stability: Not on file     Allergies   Allergen Reactions    Sulfa Drugs Anaphylaxis    Sulfasalazine Anaphylaxis     Outpatient  "Encounter Medications as of 12/12/2022   Medication Sig Dispense Refill    apixaban (ELIQUIS) 2.5mg Tab Take 1 Tablet by mouth 2 times a day. 180 Tablet 3    triamcinolone acetonide (KENALOG) 0.1 % Cream       Cholecalciferol 1000 UNIT Cap Take 1 Cap by mouth.      [DISCONTINUED] ELIQUIS 2.5 MG Tab TAKE ONE TABLET BY MOUTH TWICE DAILY 180 Tablet 3     No facility-administered encounter medications on file as of 12/12/2022.     ROS           Objective     /64 (BP Location: Left arm, Patient Position: Sitting, BP Cuff Size: Adult)   Pulse 76   Resp 18   Ht 1.676 m (5' 6\")   Wt 56.2 kg (124 lb)   SpO2 98%   BMI 20.01 kg/m²     Physical Exam  Constitutional:       Appearance: He is well-developed.   HENT:      Head: Normocephalic and atraumatic.   Cardiovascular:      Rate and Rhythm: Normal rate and regular rhythm.      Heart sounds: No murmur heard.    No friction rub. No gallop.   Pulmonary:      Effort: Pulmonary effort is normal.      Breath sounds: Normal breath sounds.   Abdominal:      Palpations: Abdomen is soft.   Musculoskeletal:         General: Normal range of motion.      Cervical back: Normal range of motion and neck supple.   Skin:     General: Skin is warm and dry.   Neurological:      Mental Status: He is alert and oriented to person, place, and time.   Psychiatric:         Behavior: Behavior normal.         Thought Content: Thought content normal.         Judgment: Judgment normal.              Assessment & Plan     1. NSVT (nonsustained ventricular tachycardia)        2. Complete heart block (HCC)        3. Anticoagulated        4. S/P ablation of atrial flutter        5. Presence of permanent cardiac pacemaker        6. PAF (paroxysmal atrial fibrillation) (HCC)  apixaban (ELIQUIS) 2.5mg Tab      7. TIA (transient ischemic attack)        8. SSS (sick sinus syndrome) ([facility])        9. Typical atrial flutter (HCC)  apixaban (ELIQUIS) 2.5mg Tab          Medical Decision Making: Today's " Assessment/Status/Plan:   1.  Permanent pacemaker normal function.  2.  Atrial arrhythmias.  In atrial flutter today.  3.  Anticoagulation refill.  4.  Mild dementia.  Offered memory clinic.  They will think about it.  5.  Follow-up in 6 months.

## 2023-01-01 ENCOUNTER — HOSPITAL ENCOUNTER (EMERGENCY)
Facility: MEDICAL CENTER | Age: 88
End: 2023-01-13
Attending: EMERGENCY MEDICINE
Payer: COMMERCIAL

## 2023-01-01 ENCOUNTER — APPOINTMENT (OUTPATIENT)
Dept: RADIOLOGY | Facility: MEDICAL CENTER | Age: 88
DRG: 194 | End: 2023-01-01
Attending: EMERGENCY MEDICINE
Payer: COMMERCIAL

## 2023-01-01 ENCOUNTER — APPOINTMENT (OUTPATIENT)
Dept: RADIOLOGY | Facility: MEDICAL CENTER | Age: 88
End: 2023-01-01
Attending: EMERGENCY MEDICINE
Payer: COMMERCIAL

## 2023-01-01 ENCOUNTER — NON-PROVIDER VISIT (OUTPATIENT)
Dept: CARDIOLOGY | Facility: MEDICAL CENTER | Age: 88
End: 2023-01-01
Payer: COMMERCIAL

## 2023-01-01 ENCOUNTER — APPOINTMENT (OUTPATIENT)
Dept: CARDIOLOGY | Facility: MEDICAL CENTER | Age: 88
End: 2023-01-01
Attending: INTERNAL MEDICINE
Payer: COMMERCIAL

## 2023-01-01 ENCOUNTER — PATIENT OUTREACH (OUTPATIENT)
Dept: SCHEDULING | Facility: IMAGING CENTER | Age: 88
End: 2023-01-01
Payer: COMMERCIAL

## 2023-01-01 ENCOUNTER — HOSPITAL ENCOUNTER (INPATIENT)
Facility: MEDICAL CENTER | Age: 88
LOS: 2 days | DRG: 194 | End: 2023-08-03
Attending: EMERGENCY MEDICINE | Admitting: HOSPITALIST
Payer: COMMERCIAL

## 2023-01-01 ENCOUNTER — HOSPITAL ENCOUNTER (EMERGENCY)
Facility: MEDICAL CENTER | Age: 88
End: 2023-09-16
Attending: EMERGENCY MEDICINE
Payer: COMMERCIAL

## 2023-01-01 ENCOUNTER — HOSPITAL ENCOUNTER (EMERGENCY)
Facility: MEDICAL CENTER | Age: 88
End: 2023-04-26
Attending: EMERGENCY MEDICINE
Payer: COMMERCIAL

## 2023-01-01 ENCOUNTER — APPOINTMENT (OUTPATIENT)
Dept: LAB | Facility: MEDICAL CENTER | Age: 88
End: 2023-01-01
Payer: COMMERCIAL

## 2023-01-01 ENCOUNTER — HOSPITAL ENCOUNTER (EMERGENCY)
Facility: MEDICAL CENTER | Age: 88
End: 2023-09-10
Attending: EMERGENCY MEDICINE
Payer: COMMERCIAL

## 2023-01-01 ENCOUNTER — HOSPITAL ENCOUNTER (EMERGENCY)
Facility: MEDICAL CENTER | Age: 88
End: 2023-09-18
Attending: EMERGENCY MEDICINE
Payer: COMMERCIAL

## 2023-01-01 ENCOUNTER — HOSPITAL ENCOUNTER (EMERGENCY)
Facility: MEDICAL CENTER | Age: 88
End: 2023-05-05
Attending: EMERGENCY MEDICINE
Payer: COMMERCIAL

## 2023-01-01 VITALS
WEIGHT: 117 LBS | RESPIRATION RATE: 21 BRPM | SYSTOLIC BLOOD PRESSURE: 104 MMHG | DIASTOLIC BLOOD PRESSURE: 55 MMHG | BODY MASS INDEX: 18.36 KG/M2 | HEIGHT: 67 IN | TEMPERATURE: 98.9 F | OXYGEN SATURATION: 97 % | HEART RATE: 69 BPM

## 2023-01-01 VITALS
HEART RATE: 70 BPM | DIASTOLIC BLOOD PRESSURE: 68 MMHG | OXYGEN SATURATION: 95 % | RESPIRATION RATE: 18 BRPM | SYSTOLIC BLOOD PRESSURE: 110 MMHG | BODY MASS INDEX: 22.82 KG/M2 | WEIGHT: 145.72 LBS | TEMPERATURE: 97.7 F

## 2023-01-01 VITALS
TEMPERATURE: 98 F | SYSTOLIC BLOOD PRESSURE: 131 MMHG | HEART RATE: 66 BPM | DIASTOLIC BLOOD PRESSURE: 62 MMHG | HEIGHT: 66 IN | OXYGEN SATURATION: 95 % | BODY MASS INDEX: 23.3 KG/M2 | RESPIRATION RATE: 18 BRPM | WEIGHT: 145 LBS

## 2023-01-01 VITALS
BODY MASS INDEX: 20.56 KG/M2 | DIASTOLIC BLOOD PRESSURE: 54 MMHG | RESPIRATION RATE: 18 BRPM | WEIGHT: 131 LBS | HEART RATE: 80 BPM | HEIGHT: 67 IN | SYSTOLIC BLOOD PRESSURE: 114 MMHG | OXYGEN SATURATION: 94 %

## 2023-01-01 VITALS
HEART RATE: 72 BPM | RESPIRATION RATE: 18 BRPM | SYSTOLIC BLOOD PRESSURE: 115 MMHG | DIASTOLIC BLOOD PRESSURE: 67 MMHG | TEMPERATURE: 97.5 F | OXYGEN SATURATION: 96 %

## 2023-01-01 VITALS
TEMPERATURE: 98.1 F | HEART RATE: 71 BPM | BODY MASS INDEX: 18.41 KG/M2 | DIASTOLIC BLOOD PRESSURE: 74 MMHG | RESPIRATION RATE: 17 BRPM | SYSTOLIC BLOOD PRESSURE: 135 MMHG | OXYGEN SATURATION: 96 % | WEIGHT: 117.28 LBS | HEIGHT: 67 IN

## 2023-01-01 VITALS
TEMPERATURE: 97.8 F | SYSTOLIC BLOOD PRESSURE: 108 MMHG | RESPIRATION RATE: 18 BRPM | DIASTOLIC BLOOD PRESSURE: 78 MMHG | WEIGHT: 145 LBS | OXYGEN SATURATION: 98 % | HEIGHT: 66 IN | HEART RATE: 78 BPM | BODY MASS INDEX: 23.3 KG/M2

## 2023-01-01 VITALS
RESPIRATION RATE: 27 BRPM | OXYGEN SATURATION: 98 % | TEMPERATURE: 98.5 F | HEART RATE: 74 BPM | DIASTOLIC BLOOD PRESSURE: 65 MMHG | SYSTOLIC BLOOD PRESSURE: 104 MMHG

## 2023-01-01 DIAGNOSIS — R79.89 ELEVATED TROPONIN: ICD-10-CM
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
## 2023-01-01 DIAGNOSIS — S00.03XA HEMATOMA OF FRONTAL SCALP, INITIAL ENCOUNTER: ICD-10-CM

## 2023-01-01 DIAGNOSIS — S09.90XA CLOSED HEAD INJURY, INITIAL ENCOUNTER: ICD-10-CM

## 2023-01-01 DIAGNOSIS — R53.1 GENERALIZED WEAKNESS: ICD-10-CM

## 2023-01-01 DIAGNOSIS — J18.9 PNEUMONIA OF LEFT LUNG DUE TO INFECTIOUS ORGANISM, UNSPECIFIED PART OF LUNG: ICD-10-CM

## 2023-01-01 DIAGNOSIS — R60.0 EDEMA LEG: ICD-10-CM

## 2023-01-01 DIAGNOSIS — I44.2 COMPLETE HEART BLOCK (HCC): ICD-10-CM

## 2023-01-01 DIAGNOSIS — R50.9 FEVER, UNSPECIFIED FEVER CAUSE: ICD-10-CM

## 2023-01-01 DIAGNOSIS — J18.1 LOBAR PNEUMONIA (HCC): ICD-10-CM

## 2023-01-01 DIAGNOSIS — N17.9 AKI (ACUTE KIDNEY INJURY) (HCC): ICD-10-CM

## 2023-01-01 DIAGNOSIS — Z86.79 S/P ABLATION OF ATRIAL FLUTTER: ICD-10-CM

## 2023-01-01 DIAGNOSIS — I48.21 PERMANENT ATRIAL FIBRILLATION (HCC): ICD-10-CM

## 2023-01-01 DIAGNOSIS — E86.0 DEHYDRATION: ICD-10-CM

## 2023-01-01 DIAGNOSIS — R62.7 FAILURE TO THRIVE IN ADULT: ICD-10-CM

## 2023-01-01 DIAGNOSIS — S01.81XA FOREHEAD LACERATION, INITIAL ENCOUNTER: ICD-10-CM

## 2023-01-01 DIAGNOSIS — Z95.0 PRESENCE OF PERMANENT CARDIAC PACEMAKER: Chronic | ICD-10-CM

## 2023-01-01 DIAGNOSIS — Z98.890 S/P ABLATION OF ATRIAL FLUTTER: ICD-10-CM

## 2023-01-01 DIAGNOSIS — Z79.01 ANTICOAGULATED: ICD-10-CM

## 2023-01-01 DIAGNOSIS — R60.9 PERIPHERAL EDEMA: ICD-10-CM

## 2023-01-01 DIAGNOSIS — L03.116 CELLULITIS OF LEFT LOWER EXTREMITY: ICD-10-CM

## 2023-01-01 DIAGNOSIS — L03.115 CELLULITIS OF RIGHT LOWER EXTREMITY: ICD-10-CM

## 2023-01-01 LAB
ALBUMIN SERPL BCP-MCNC: 3 G/DL (ref 3.2–4.9)
ALBUMIN SERPL BCP-MCNC: 3.5 G/DL (ref 3.2–4.9)
ALBUMIN SERPL BCP-MCNC: 3.6 G/DL (ref 3.2–4.9)
ALBUMIN SERPL BCP-MCNC: 3.7 G/DL (ref 3.2–4.9)
ALBUMIN SERPL BCP-MCNC: 3.7 G/DL (ref 3.2–4.9)
ALBUMIN SERPL BCP-MCNC: 4.4 G/DL (ref 3.2–4.9)
ALBUMIN/GLOB SERPL: 0.9 G/DL
ALBUMIN/GLOB SERPL: 1 G/DL
ALBUMIN/GLOB SERPL: 1.1 G/DL
ALBUMIN/GLOB SERPL: 1.3 G/DL
ALP SERPL-CCNC: 51 U/L (ref 30–99)
ALP SERPL-CCNC: 51 U/L (ref 30–99)
ALP SERPL-CCNC: 56 U/L (ref 30–99)
ALP SERPL-CCNC: 56 U/L (ref 30–99)
ALP SERPL-CCNC: 57 U/L (ref 30–99)
ALP SERPL-CCNC: 59 U/L (ref 30–99)
ALP SERPL-CCNC: 61 U/L (ref 30–99)
ALP SERPL-CCNC: 62 U/L (ref 30–99)
ALT SERPL-CCNC: 11 U/L (ref 2–50)
ALT SERPL-CCNC: 12 U/L (ref 2–50)
ALT SERPL-CCNC: 12 U/L (ref 2–50)
ALT SERPL-CCNC: 6 U/L (ref 2–50)
ALT SERPL-CCNC: 7 U/L (ref 2–50)
ALT SERPL-CCNC: 7 U/L (ref 2–50)
ALT SERPL-CCNC: 8 U/L (ref 2–50)
ALT SERPL-CCNC: 9 U/L (ref 2–50)
ANION GAP SERPL CALC-SCNC: 11 MMOL/L (ref 7–16)
ANION GAP SERPL CALC-SCNC: 11 MMOL/L (ref 7–16)
ANION GAP SERPL CALC-SCNC: 12 MMOL/L (ref 7–16)
ANION GAP SERPL CALC-SCNC: 13 MMOL/L (ref 7–16)
ANION GAP SERPL CALC-SCNC: 14 MMOL/L (ref 7–16)
ANION GAP SERPL CALC-SCNC: 14 MMOL/L (ref 7–16)
APPEARANCE UR: CLEAR
APTT PPP: 30.5 SEC (ref 24.7–36)
AST SERPL-CCNC: 15 U/L (ref 12–45)
AST SERPL-CCNC: 16 U/L (ref 12–45)
AST SERPL-CCNC: 17 U/L (ref 12–45)
AST SERPL-CCNC: 18 U/L (ref 12–45)
AST SERPL-CCNC: 19 U/L (ref 12–45)
AST SERPL-CCNC: 20 U/L (ref 12–45)
AST SERPL-CCNC: 20 U/L (ref 12–45)
AST SERPL-CCNC: 22 U/L (ref 12–45)
BACTERIA #/AREA URNS HPF: NEGATIVE /HPF
BACTERIA #/AREA URNS HPF: NEGATIVE /HPF
BACTERIA BLD CULT: NORMAL
BACTERIA BLD CULT: NORMAL
BACTERIA UR CULT: NORMAL
BASOPHILS # BLD AUTO: 0.2 % (ref 0–1.8)
BASOPHILS # BLD AUTO: 0.4 % (ref 0–1.8)
BASOPHILS # BLD AUTO: 0.6 % (ref 0–1.8)
BASOPHILS # BLD AUTO: 0.7 % (ref 0–1.8)
BASOPHILS # BLD AUTO: 0.8 % (ref 0–1.8)
BASOPHILS # BLD AUTO: 0.8 % (ref 0–1.8)
BASOPHILS # BLD AUTO: 1 % (ref 0–1.8)
BASOPHILS # BLD AUTO: 1.1 % (ref 0–1.8)
BASOPHILS # BLD: 0.01 K/UL (ref 0–0.12)
BASOPHILS # BLD: 0.03 K/UL (ref 0–0.12)
BASOPHILS # BLD: 0.03 K/UL (ref 0–0.12)
BASOPHILS # BLD: 0.04 K/UL (ref 0–0.12)
BASOPHILS # BLD: 0.05 K/UL (ref 0–0.12)
BASOPHILS # BLD: 0.06 K/UL (ref 0–0.12)
BILIRUB SERPL-MCNC: 0.4 MG/DL (ref 0.1–1.5)
BILIRUB SERPL-MCNC: 0.5 MG/DL (ref 0.1–1.5)
BILIRUB SERPL-MCNC: 0.5 MG/DL (ref 0.1–1.5)
BILIRUB SERPL-MCNC: 0.6 MG/DL (ref 0.1–1.5)
BILIRUB SERPL-MCNC: 0.6 MG/DL (ref 0.1–1.5)
BILIRUB SERPL-MCNC: 0.7 MG/DL (ref 0.1–1.5)
BILIRUB UR QL STRIP.AUTO: NEGATIVE
BUN SERPL-MCNC: 28 MG/DL (ref 8–22)
BUN SERPL-MCNC: 28 MG/DL (ref 8–22)
BUN SERPL-MCNC: 30 MG/DL (ref 8–22)
BUN SERPL-MCNC: 30 MG/DL (ref 8–22)
BUN SERPL-MCNC: 32 MG/DL (ref 8–22)
BUN SERPL-MCNC: 34 MG/DL (ref 8–22)
BUN SERPL-MCNC: 35 MG/DL (ref 8–22)
BUN SERPL-MCNC: 36 MG/DL (ref 8–22)
CALCIUM ALBUM COR SERPL-MCNC: 8.2 MG/DL (ref 8.5–10.5)
CALCIUM ALBUM COR SERPL-MCNC: 8.7 MG/DL (ref 8.5–10.5)
CALCIUM ALBUM COR SERPL-MCNC: 8.7 MG/DL (ref 8.5–10.5)
CALCIUM ALBUM COR SERPL-MCNC: 8.8 MG/DL (ref 8.5–10.5)
CALCIUM ALBUM COR SERPL-MCNC: 8.9 MG/DL (ref 8.5–10.5)
CALCIUM ALBUM COR SERPL-MCNC: 9 MG/DL (ref 8.5–10.5)
CALCIUM SERPL-MCNC: 7.9 MG/DL (ref 8.5–10.5)
CALCIUM SERPL-MCNC: 8.1 MG/DL (ref 8.4–10.2)
CALCIUM SERPL-MCNC: 8.3 MG/DL (ref 8.4–10.2)
CALCIUM SERPL-MCNC: 8.4 MG/DL (ref 8.5–10.5)
CALCIUM SERPL-MCNC: 8.5 MG/DL (ref 8.5–10.5)
CALCIUM SERPL-MCNC: 8.6 MG/DL (ref 8.4–10.2)
CALCIUM SERPL-MCNC: 8.8 MG/DL (ref 8.4–10.2)
CALCIUM SERPL-MCNC: 9.3 MG/DL (ref 8.4–10.2)
CHLORIDE SERPL-SCNC: 101 MMOL/L (ref 96–112)
CHLORIDE SERPL-SCNC: 103 MMOL/L (ref 96–112)
CHLORIDE SERPL-SCNC: 103 MMOL/L (ref 96–112)
CHLORIDE SERPL-SCNC: 104 MMOL/L (ref 96–112)
CHLORIDE SERPL-SCNC: 97 MMOL/L (ref 96–112)
CHLORIDE SERPL-SCNC: 98 MMOL/L (ref 96–112)
CK SERPL-CCNC: 121 U/L (ref 0–154)
CO2 SERPL-SCNC: 21 MMOL/L (ref 20–33)
CO2 SERPL-SCNC: 21 MMOL/L (ref 20–33)
CO2 SERPL-SCNC: 23 MMOL/L (ref 20–33)
CO2 SERPL-SCNC: 23 MMOL/L (ref 20–33)
CO2 SERPL-SCNC: 24 MMOL/L (ref 20–33)
CO2 SERPL-SCNC: 25 MMOL/L (ref 20–33)
COLOR UR: YELLOW
CREAT SERPL-MCNC: 1.34 MG/DL (ref 0.5–1.4)
CREAT SERPL-MCNC: 1.42 MG/DL (ref 0.5–1.4)
CREAT SERPL-MCNC: 1.47 MG/DL (ref 0.5–1.4)
CREAT SERPL-MCNC: 1.54 MG/DL (ref 0.5–1.4)
CREAT SERPL-MCNC: 1.57 MG/DL (ref 0.5–1.4)
CREAT SERPL-MCNC: 1.62 MG/DL (ref 0.5–1.4)
CREAT SERPL-MCNC: 1.63 MG/DL (ref 0.5–1.4)
CREAT SERPL-MCNC: 1.71 MG/DL (ref 0.5–1.4)
EKG IMPRESSION: NORMAL
EOSINOPHIL # BLD AUTO: 0.02 K/UL (ref 0–0.51)
EOSINOPHIL # BLD AUTO: 0.03 K/UL (ref 0–0.51)
EOSINOPHIL # BLD AUTO: 0.17 K/UL (ref 0–0.51)
EOSINOPHIL # BLD AUTO: 0.36 K/UL (ref 0–0.51)
EOSINOPHIL # BLD AUTO: 0.46 K/UL (ref 0–0.51)
EOSINOPHIL # BLD AUTO: 0.52 K/UL (ref 0–0.51)
EOSINOPHIL # BLD AUTO: 0.57 K/UL (ref 0–0.51)
EOSINOPHIL # BLD AUTO: 0.9 K/UL (ref 0–0.51)
EOSINOPHIL NFR BLD: 0.3 % (ref 0–6.9)
EOSINOPHIL NFR BLD: 0.4 % (ref 0–6.9)
EOSINOPHIL NFR BLD: 11.1 % (ref 0–6.9)
EOSINOPHIL NFR BLD: 12.2 % (ref 0–6.9)
EOSINOPHIL NFR BLD: 3.9 % (ref 0–6.9)
EOSINOPHIL NFR BLD: 5.4 % (ref 0–6.9)
EOSINOPHIL NFR BLD: 8.8 % (ref 0–6.9)
EOSINOPHIL NFR BLD: 9.5 % (ref 0–6.9)
EPI CELLS #/AREA URNS HPF: ABNORMAL /HPF
EPI CELLS #/AREA URNS HPF: NEGATIVE /HPF
ERYTHROCYTE [DISTWIDTH] IN BLOOD BY AUTOMATED COUNT: 45.1 FL (ref 35.9–50)
ERYTHROCYTE [DISTWIDTH] IN BLOOD BY AUTOMATED COUNT: 49.4 FL (ref 35.9–50)
ERYTHROCYTE [DISTWIDTH] IN BLOOD BY AUTOMATED COUNT: 50.7 FL (ref 35.9–50)
ERYTHROCYTE [DISTWIDTH] IN BLOOD BY AUTOMATED COUNT: 50.9 FL (ref 35.9–50)
ERYTHROCYTE [DISTWIDTH] IN BLOOD BY AUTOMATED COUNT: 52 FL (ref 35.9–50)
ERYTHROCYTE [DISTWIDTH] IN BLOOD BY AUTOMATED COUNT: 52 FL (ref 35.9–50)
ERYTHROCYTE [DISTWIDTH] IN BLOOD BY AUTOMATED COUNT: 52.6 FL (ref 35.9–50)
ERYTHROCYTE [DISTWIDTH] IN BLOOD BY AUTOMATED COUNT: 53.1 FL (ref 35.9–50)
ERYTHROCYTE [DISTWIDTH] IN BLOOD BY AUTOMATED COUNT: 53.8 FL (ref 35.9–50)
FLUAV RNA SPEC QL NAA+PROBE: NEGATIVE
FLUBV RNA SPEC QL NAA+PROBE: NEGATIVE
GFR SERPLBLD CREATININE-BSD FMLA CKD-EPI: 37 ML/MIN/1.73 M 2
GFR SERPLBLD CREATININE-BSD FMLA CKD-EPI: 39 ML/MIN/1.73 M 2
GFR SERPLBLD CREATININE-BSD FMLA CKD-EPI: 39 ML/MIN/1.73 M 2
GFR SERPLBLD CREATININE-BSD FMLA CKD-EPI: 41 ML/MIN/1.73 M 2
GFR SERPLBLD CREATININE-BSD FMLA CKD-EPI: 42 ML/MIN/1.73 M 2
GFR SERPLBLD CREATININE-BSD FMLA CKD-EPI: 44 ML/MIN/1.73 M 2
GFR SERPLBLD CREATININE-BSD FMLA CKD-EPI: 46 ML/MIN/1.73 M 2
GFR SERPLBLD CREATININE-BSD FMLA CKD-EPI: 49 ML/MIN/1.73 M 2
GLOBULIN SER CALC-MCNC: 3.1 G/DL (ref 1.9–3.5)
GLOBULIN SER CALC-MCNC: 3.3 G/DL (ref 1.9–3.5)
GLOBULIN SER CALC-MCNC: 3.4 G/DL (ref 1.9–3.5)
GLOBULIN SER CALC-MCNC: 3.7 G/DL (ref 1.9–3.5)
GLOBULIN SER CALC-MCNC: 3.8 G/DL (ref 1.9–3.5)
GLOBULIN SER CALC-MCNC: 3.9 G/DL (ref 1.9–3.5)
GLOBULIN SER CALC-MCNC: 4 G/DL (ref 1.9–3.5)
GLOBULIN SER CALC-MCNC: 4.1 G/DL (ref 1.9–3.5)
GLUCOSE SERPL-MCNC: 101 MG/DL (ref 65–99)
GLUCOSE SERPL-MCNC: 106 MG/DL (ref 65–99)
GLUCOSE SERPL-MCNC: 135 MG/DL (ref 65–99)
GLUCOSE SERPL-MCNC: 83 MG/DL (ref 65–99)
GLUCOSE SERPL-MCNC: 88 MG/DL (ref 65–99)
GLUCOSE SERPL-MCNC: 89 MG/DL (ref 65–99)
GLUCOSE SERPL-MCNC: 90 MG/DL (ref 65–99)
GLUCOSE SERPL-MCNC: 95 MG/DL (ref 65–99)
GLUCOSE UR STRIP.AUTO-MCNC: NEGATIVE MG/DL
HCT VFR BLD AUTO: 33.4 % (ref 42–52)
HCT VFR BLD AUTO: 33.6 % (ref 42–52)
HCT VFR BLD AUTO: 33.9 % (ref 42–52)
HCT VFR BLD AUTO: 34.3 % (ref 42–52)
HCT VFR BLD AUTO: 35.4 % (ref 42–52)
HCT VFR BLD AUTO: 35.4 % (ref 42–52)
HCT VFR BLD AUTO: 37.8 % (ref 42–52)
HCT VFR BLD AUTO: 40.6 % (ref 42–52)
HCT VFR BLD AUTO: 47 % (ref 42–52)
HGB BLD-MCNC: 10.3 G/DL (ref 14–18)
HGB BLD-MCNC: 10.7 G/DL (ref 14–18)
HGB BLD-MCNC: 10.8 G/DL (ref 14–18)
HGB BLD-MCNC: 10.9 G/DL (ref 14–18)
HGB BLD-MCNC: 11 G/DL (ref 14–18)
HGB BLD-MCNC: 11.3 G/DL (ref 14–18)
HGB BLD-MCNC: 12.1 G/DL (ref 14–18)
HGB BLD-MCNC: 13 G/DL (ref 14–18)
HGB BLD-MCNC: 15.8 G/DL (ref 14–18)
HYALINE CASTS #/AREA URNS LPF: ABNORMAL /LPF
IMM GRANULOCYTES # BLD AUTO: 0.01 K/UL (ref 0–0.11)
IMM GRANULOCYTES # BLD AUTO: 0.01 K/UL (ref 0–0.11)
IMM GRANULOCYTES # BLD AUTO: 0.02 K/UL (ref 0–0.11)
IMM GRANULOCYTES # BLD AUTO: 0.03 K/UL (ref 0–0.11)
IMM GRANULOCYTES # BLD AUTO: 0.03 K/UL (ref 0–0.11)
IMM GRANULOCYTES NFR BLD AUTO: 0.2 % (ref 0–0.9)
IMM GRANULOCYTES NFR BLD AUTO: 0.2 % (ref 0–0.9)
IMM GRANULOCYTES NFR BLD AUTO: 0.3 % (ref 0–0.9)
IMM GRANULOCYTES NFR BLD AUTO: 0.3 % (ref 0–0.9)
IMM GRANULOCYTES NFR BLD AUTO: 0.4 % (ref 0–0.9)
IMM GRANULOCYTES NFR BLD AUTO: 0.5 % (ref 0–0.9)
INR PPP: 1.24 (ref 0.87–1.13)
KETONES UR STRIP.AUTO-MCNC: 15 MG/DL
KETONES UR STRIP.AUTO-MCNC: ABNORMAL MG/DL
KETONES UR STRIP.AUTO-MCNC: NEGATIVE MG/DL
LACTATE SERPL-SCNC: 1.6 MMOL/L (ref 0.5–2)
LACTATE SERPL-SCNC: 1.8 MMOL/L (ref 0.5–2)
LACTATE SERPL-SCNC: 1.9 MMOL/L (ref 0.5–2)
LACTATE SERPL-SCNC: 2.2 MMOL/L (ref 0.5–2)
LACTATE SERPL-SCNC: 2.8 MMOL/L (ref 0.5–2)
LEUKOCYTE ESTERASE UR QL STRIP.AUTO: NEGATIVE
LYMPHOCYTES # BLD AUTO: 1.03 K/UL (ref 1–4.8)
LYMPHOCYTES # BLD AUTO: 1.04 K/UL (ref 1–4.8)
LYMPHOCYTES # BLD AUTO: 1.27 K/UL (ref 1–4.8)
LYMPHOCYTES # BLD AUTO: 1.32 K/UL (ref 1–4.8)
LYMPHOCYTES # BLD AUTO: 1.37 K/UL (ref 1–4.8)
LYMPHOCYTES # BLD AUTO: 1.41 K/UL (ref 1–4.8)
LYMPHOCYTES # BLD AUTO: 1.47 K/UL (ref 1–4.8)
LYMPHOCYTES # BLD AUTO: 1.63 K/UL (ref 1–4.8)
LYMPHOCYTES NFR BLD: 13.7 % (ref 22–41)
LYMPHOCYTES NFR BLD: 16.6 % (ref 22–41)
LYMPHOCYTES NFR BLD: 19.1 % (ref 22–41)
LYMPHOCYTES NFR BLD: 19.1 % (ref 22–41)
LYMPHOCYTES NFR BLD: 20.6 % (ref 22–41)
LYMPHOCYTES NFR BLD: 25.2 % (ref 22–41)
LYMPHOCYTES NFR BLD: 28.7 % (ref 22–41)
LYMPHOCYTES NFR BLD: 37.1 % (ref 22–41)
MAGNESIUM SERPL-MCNC: 2.2 MG/DL (ref 1.5–2.5)
MCH RBC QN AUTO: 27.8 PG (ref 27–33)
MCH RBC QN AUTO: 27.9 PG (ref 27–33)
MCH RBC QN AUTO: 27.9 PG (ref 27–33)
MCH RBC QN AUTO: 28.1 PG (ref 27–33)
MCH RBC QN AUTO: 28.1 PG (ref 27–33)
MCH RBC QN AUTO: 28.5 PG (ref 27–33)
MCH RBC QN AUTO: 29.8 PG (ref 27–33)
MCH RBC QN AUTO: 29.9 PG (ref 27–33)
MCH RBC QN AUTO: 30.6 PG (ref 27–33)
MCHC RBC AUTO-ENTMCNC: 30.8 G/DL (ref 32.3–36.5)
MCHC RBC AUTO-ENTMCNC: 31.1 G/DL (ref 32.3–36.5)
MCHC RBC AUTO-ENTMCNC: 31.2 G/DL (ref 32.3–36.5)
MCHC RBC AUTO-ENTMCNC: 31.9 G/DL (ref 32.3–36.5)
MCHC RBC AUTO-ENTMCNC: 32 G/DL (ref 33.7–35.3)
MCHC RBC AUTO-ENTMCNC: 32 G/DL (ref 33.7–35.3)
MCHC RBC AUTO-ENTMCNC: 32.1 G/DL (ref 32.3–36.5)
MCHC RBC AUTO-ENTMCNC: 32.2 G/DL (ref 32.3–36.5)
MCHC RBC AUTO-ENTMCNC: 33.6 G/DL (ref 33.7–35.3)
MCV RBC AUTO: 86.7 FL (ref 81.4–97.8)
MCV RBC AUTO: 87.3 FL (ref 81.4–97.8)
MCV RBC AUTO: 87.4 FL (ref 81.4–97.8)
MCV RBC AUTO: 90 FL (ref 81.4–97.8)
MCV RBC AUTO: 90 FL (ref 81.4–97.8)
MCV RBC AUTO: 90.9 FL (ref 81.4–97.8)
MCV RBC AUTO: 91.7 FL (ref 81.4–97.8)
MCV RBC AUTO: 93.1 FL (ref 81.4–97.8)
MCV RBC AUTO: 93.3 FL (ref 81.4–97.8)
MICRO URNS: ABNORMAL
MONOCYTES # BLD AUTO: 0.54 K/UL (ref 0–0.85)
MONOCYTES # BLD AUTO: 0.59 K/UL (ref 0–0.85)
MONOCYTES # BLD AUTO: 0.59 K/UL (ref 0–0.85)
MONOCYTES # BLD AUTO: 0.6 K/UL (ref 0–0.85)
MONOCYTES # BLD AUTO: 0.63 K/UL (ref 0–0.85)
MONOCYTES # BLD AUTO: 0.64 K/UL (ref 0–0.85)
MONOCYTES # BLD AUTO: 0.65 K/UL (ref 0–0.85)
MONOCYTES # BLD AUTO: 0.71 K/UL (ref 0–0.85)
MONOCYTES NFR BLD AUTO: 10.8 % (ref 0–13.4)
MONOCYTES NFR BLD AUTO: 11.3 % (ref 0–13.4)
MONOCYTES NFR BLD AUTO: 12.3 % (ref 0–13.4)
MONOCYTES NFR BLD AUTO: 12.3 % (ref 0–13.4)
MONOCYTES NFR BLD AUTO: 8 % (ref 0–13.4)
MONOCYTES NFR BLD AUTO: 8.5 % (ref 0–13.4)
MONOCYTES NFR BLD AUTO: 9.6 % (ref 0–13.4)
MONOCYTES NFR BLD AUTO: 9.6 % (ref 0–13.4)
NEUTROPHILS # BLD AUTO: 1.99 K/UL (ref 1.82–7.42)
NEUTROPHILS # BLD AUTO: 2.4 K/UL (ref 1.82–7.42)
NEUTROPHILS # BLD AUTO: 2.8 K/UL (ref 1.82–7.42)
NEUTROPHILS # BLD AUTO: 3.25 K/UL (ref 1.82–7.42)
NEUTROPHILS # BLD AUTO: 4.24 K/UL (ref 1.82–7.42)
NEUTROPHILS # BLD AUTO: 4.27 K/UL (ref 1.82–7.42)
NEUTROPHILS # BLD AUTO: 5.67 K/UL (ref 1.82–7.42)
NEUTROPHILS # BLD AUTO: 5.8 K/UL (ref 1.82–7.42)
NEUTROPHILS NFR BLD: 45.4 % (ref 44–72)
NEUTROPHILS NFR BLD: 46.7 % (ref 44–72)
NEUTROPHILS NFR BLD: 53.5 % (ref 44–72)
NEUTROPHILS NFR BLD: 58 % (ref 44–72)
NEUTROPHILS NFR BLD: 59.6 % (ref 44–72)
NEUTROPHILS NFR BLD: 63.7 % (ref 44–72)
NEUTROPHILS NFR BLD: 73.8 % (ref 44–72)
NEUTROPHILS NFR BLD: 76.9 % (ref 44–72)
NITRITE UR QL STRIP.AUTO: NEGATIVE
NRBC # BLD AUTO: 0 K/UL
NRBC BLD-RTO: 0 /100 WBC
NRBC BLD-RTO: 0 /100 WBC (ref 0–0.2)
PH UR STRIP.AUTO: 5.5 [PH] (ref 5–8)
PH UR STRIP.AUTO: 5.5 [PH] (ref 5–8)
PH UR STRIP.AUTO: 6.5 [PH] (ref 5–8)
PLATELET # BLD AUTO: 156 K/UL (ref 164–446)
PLATELET # BLD AUTO: 177 K/UL (ref 164–446)
PLATELET # BLD AUTO: 178 K/UL (ref 164–446)
PLATELET # BLD AUTO: 188 K/UL (ref 164–446)
PLATELET # BLD AUTO: 197 K/UL (ref 164–446)
PLATELET # BLD AUTO: 200 K/UL (ref 164–446)
PLATELET # BLD AUTO: 218 K/UL (ref 164–446)
PLATELET # BLD AUTO: 220 K/UL (ref 164–446)
PLATELET # BLD AUTO: 221 K/UL (ref 164–446)
PMV BLD AUTO: 11 FL (ref 9–12.9)
PMV BLD AUTO: 9 FL (ref 9–12.9)
PMV BLD AUTO: 9.2 FL (ref 9–12.9)
PMV BLD AUTO: 9.3 FL (ref 9–12.9)
PMV BLD AUTO: 9.4 FL (ref 9–12.9)
PMV BLD AUTO: 9.6 FL (ref 9–12.9)
PMV BLD AUTO: 9.7 FL (ref 9–12.9)
POTASSIUM SERPL-SCNC: 4.4 MMOL/L (ref 3.6–5.5)
POTASSIUM SERPL-SCNC: 4.6 MMOL/L (ref 3.6–5.5)
POTASSIUM SERPL-SCNC: 4.8 MMOL/L (ref 3.6–5.5)
POTASSIUM SERPL-SCNC: 4.8 MMOL/L (ref 3.6–5.5)
POTASSIUM SERPL-SCNC: 5.1 MMOL/L (ref 3.6–5.5)
POTASSIUM SERPL-SCNC: 5.1 MMOL/L (ref 3.6–5.5)
PROT SERPL-MCNC: 6.4 G/DL (ref 6–8.2)
PROT SERPL-MCNC: 6.6 G/DL (ref 6–8.2)
PROT SERPL-MCNC: 7.3 G/DL (ref 6–8.2)
PROT SERPL-MCNC: 7.4 G/DL (ref 6–8.2)
PROT SERPL-MCNC: 7.6 G/DL (ref 6–8.2)
PROT SERPL-MCNC: 7.7 G/DL (ref 6–8.2)
PROT UR QL STRIP: 30 MG/DL
PROT UR QL STRIP: NEGATIVE MG/DL
PROT UR QL STRIP: NEGATIVE MG/DL
PROTHROMBIN TIME: 15.4 SEC (ref 12–14.6)
RBC # BLD AUTO: 3.71 M/UL (ref 4.7–6.1)
RBC # BLD AUTO: 3.81 M/UL (ref 4.7–6.1)
RBC # BLD AUTO: 3.85 M/UL (ref 4.7–6.1)
RBC # BLD AUTO: 3.86 M/UL (ref 4.7–6.1)
RBC # BLD AUTO: 3.91 M/UL (ref 4.7–6.1)
RBC # BLD AUTO: 4.05 M/UL (ref 4.7–6.1)
RBC # BLD AUTO: 4.06 M/UL (ref 4.7–6.1)
RBC # BLD AUTO: 4.35 M/UL (ref 4.7–6.1)
RBC # BLD AUTO: 5.17 M/UL (ref 4.7–6.1)
RBC # URNS HPF: ABNORMAL /HPF
RBC # URNS HPF: ABNORMAL /HPF
RBC UR QL AUTO: ABNORMAL
RBC UR QL AUTO: NEGATIVE
RBC UR QL AUTO: NEGATIVE
RSV RNA SPEC QL NAA+PROBE: NEGATIVE
SARS-COV-2 RNA RESP QL NAA+PROBE: NOTDETECTED
SCCMEC + MECA PNL NOSE NAA+PROBE: NEGATIVE
SIGNIFICANT IND 70042: NORMAL
SITE SITE: NORMAL
SODIUM SERPL-SCNC: 134 MMOL/L (ref 135–145)
SODIUM SERPL-SCNC: 135 MMOL/L (ref 135–145)
SODIUM SERPL-SCNC: 136 MMOL/L (ref 135–145)
SODIUM SERPL-SCNC: 136 MMOL/L (ref 135–145)
SODIUM SERPL-SCNC: 137 MMOL/L (ref 135–145)
SODIUM SERPL-SCNC: 137 MMOL/L (ref 135–145)
SODIUM SERPL-SCNC: 138 MMOL/L (ref 135–145)
SODIUM SERPL-SCNC: 139 MMOL/L (ref 135–145)
SOURCE SOURCE: NORMAL
SP GR UR STRIP.AUTO: 1.01
SP GR UR STRIP.AUTO: 1.02
SP GR UR STRIP.AUTO: >=1.03
SPECIMEN SOURCE: NORMAL
TROPONIN T SERPL-MCNC: 61 NG/L (ref 6–19)
TROPONIN T SERPL-MCNC: 63 NG/L (ref 6–19)
TROPONIN T SERPL-MCNC: 81 NG/L (ref 6–19)
UROBILINOGEN UR STRIP.AUTO-MCNC: 0.2 MG/DL
WBC # BLD AUTO: 4.4 K/UL (ref 4.8–10.8)
WBC # BLD AUTO: 5.1 K/UL (ref 4.8–10.8)
WBC # BLD AUTO: 5.2 K/UL (ref 4.8–10.8)
WBC # BLD AUTO: 5.5 K/UL (ref 4.8–10.8)
WBC # BLD AUTO: 6.7 K/UL (ref 4.8–10.8)
WBC # BLD AUTO: 7.2 K/UL (ref 4.8–10.8)
WBC # BLD AUTO: 7.4 K/UL (ref 4.8–10.8)
WBC # BLD AUTO: 7.5 K/UL (ref 4.8–10.8)
WBC # BLD AUTO: 7.7 K/UL (ref 4.8–10.8)
WBC #/AREA URNS HPF: ABNORMAL /HPF
WBC #/AREA URNS HPF: ABNORMAL /HPF

## 2023-01-01 PROCEDURE — 85025 COMPLETE CBC W/AUTO DIFF WBC: CPT

## 2023-01-01 PROCEDURE — 99285 EMERGENCY DEPT VISIT HI MDM: CPT

## 2023-01-01 PROCEDURE — 71045 X-RAY EXAM CHEST 1 VIEW: CPT

## 2023-01-01 PROCEDURE — 84484 ASSAY OF TROPONIN QUANT: CPT

## 2023-01-01 PROCEDURE — 80053 COMPREHEN METABOLIC PANEL: CPT

## 2023-01-01 PROCEDURE — 72125 CT NECK SPINE W/O DYE: CPT

## 2023-01-01 PROCEDURE — 83735 ASSAY OF MAGNESIUM: CPT

## 2023-01-01 PROCEDURE — 70450 CT HEAD/BRAIN W/O DYE: CPT

## 2023-01-01 PROCEDURE — 84484 ASSAY OF TROPONIN QUANT: CPT | Mod: 91

## 2023-01-01 PROCEDURE — 94760 N-INVAS EAR/PLS OXIMETRY 1: CPT

## 2023-01-01 PROCEDURE — 305308 HCHG STAPLER,SKIN,DISP.

## 2023-01-01 PROCEDURE — 93280 PM DEVICE PROGR EVAL DUAL: CPT | Mod: 26 | Performed by: INTERNAL MEDICINE

## 2023-01-01 PROCEDURE — 303105 HCHG CATHETER EXTRA

## 2023-01-01 PROCEDURE — 81001 URINALYSIS AUTO W/SCOPE: CPT

## 2023-01-01 PROCEDURE — 700111 HCHG RX REV CODE 636 W/ 250 OVERRIDE (IP): Performed by: EMERGENCY MEDICINE

## 2023-01-01 PROCEDURE — A9270 NON-COVERED ITEM OR SERVICE: HCPCS | Performed by: EMERGENCY MEDICINE

## 2023-01-01 PROCEDURE — 700111 HCHG RX REV CODE 636 W/ 250 OVERRIDE (IP): Performed by: HOSPITALIST

## 2023-01-01 PROCEDURE — 700111 HCHG RX REV CODE 636 W/ 250 OVERRIDE (IP): Mod: JZ | Performed by: EMERGENCY MEDICINE

## 2023-01-01 PROCEDURE — 87641 MR-STAPH DNA AMP PROBE: CPT

## 2023-01-01 PROCEDURE — 99284 EMERGENCY DEPT VISIT MOD MDM: CPT

## 2023-01-01 PROCEDURE — 700105 HCHG RX REV CODE 258: Performed by: HOSPITALIST

## 2023-01-01 PROCEDURE — 700105 HCHG RX REV CODE 258: Mod: JZ | Performed by: HOSPITALIST

## 2023-01-01 PROCEDURE — 700105 HCHG RX REV CODE 258: Performed by: EMERGENCY MEDICINE

## 2023-01-01 PROCEDURE — 85027 COMPLETE CBC AUTOMATED: CPT

## 2023-01-01 PROCEDURE — A9270 NON-COVERED ITEM OR SERVICE: HCPCS | Performed by: HOSPITALIST

## 2023-01-01 PROCEDURE — 87086 URINE CULTURE/COLONY COUNT: CPT

## 2023-01-01 PROCEDURE — 99232 SBSQ HOSP IP/OBS MODERATE 35: CPT | Mod: 25 | Performed by: HOSPITALIST

## 2023-01-01 PROCEDURE — 303747 HCHG EXTRA SUTURE

## 2023-01-01 PROCEDURE — 99239 HOSP IP/OBS DSCHRG MGMT >30: CPT | Performed by: HOSPITALIST

## 2023-01-01 PROCEDURE — 83605 ASSAY OF LACTIC ACID: CPT | Mod: 91

## 2023-01-01 PROCEDURE — 3078F DIAST BP <80 MM HG: CPT | Performed by: INTERNAL MEDICINE

## 2023-01-01 PROCEDURE — 36415 COLL VENOUS BLD VENIPUNCTURE: CPT

## 2023-01-01 PROCEDURE — C9803 HOPD COVID-19 SPEC COLLECT: HCPCS | Performed by: EMERGENCY MEDICINE

## 2023-01-01 PROCEDURE — 83605 ASSAY OF LACTIC ACID: CPT

## 2023-01-01 PROCEDURE — 93294 REM INTERROG EVL PM/LDLS PM: CPT | Performed by: INTERNAL MEDICINE

## 2023-01-01 PROCEDURE — 96365 THER/PROPH/DIAG IV INF INIT: CPT

## 2023-01-01 PROCEDURE — 90715 TDAP VACCINE 7 YRS/> IM: CPT | Performed by: EMERGENCY MEDICINE

## 2023-01-01 PROCEDURE — 81003 URINALYSIS AUTO W/O SCOPE: CPT

## 2023-01-01 PROCEDURE — 85730 THROMBOPLASTIN TIME PARTIAL: CPT

## 2023-01-01 PROCEDURE — 72131 CT LUMBAR SPINE W/O DYE: CPT

## 2023-01-01 PROCEDURE — 85610 PROTHROMBIN TIME: CPT

## 2023-01-01 PROCEDURE — 96375 TX/PRO/DX INJ NEW DRUG ADDON: CPT

## 2023-01-01 PROCEDURE — 99497 ADVNCD CARE PLAN 30 MIN: CPT | Performed by: HOSPITALIST

## 2023-01-01 PROCEDURE — 700102 HCHG RX REV CODE 250 W/ 637 OVERRIDE(OP): Performed by: EMERGENCY MEDICINE

## 2023-01-01 PROCEDURE — 82550 ASSAY OF CK (CPK): CPT

## 2023-01-01 PROCEDURE — 0241U HCHG SARS-COV-2 COVID-19 NFCT DS RESP RNA 4 TRGT MIC: CPT

## 2023-01-01 PROCEDURE — 97597 DBRDMT OPN WND 1ST 20 CM/<: CPT

## 2023-01-01 PROCEDURE — 87040 BLOOD CULTURE FOR BACTERIA: CPT

## 2023-01-01 PROCEDURE — 770001 HCHG ROOM/CARE - MED/SURG/GYN PRIV*

## 2023-01-01 PROCEDURE — 99223 1ST HOSP IP/OBS HIGH 75: CPT | Mod: AI | Performed by: HOSPITALIST

## 2023-01-01 PROCEDURE — 93005 ELECTROCARDIOGRAM TRACING: CPT | Performed by: EMERGENCY MEDICINE

## 2023-01-01 PROCEDURE — 93280 PM DEVICE PROGR EVAL DUAL: CPT | Performed by: INTERNAL MEDICINE

## 2023-01-01 PROCEDURE — 3074F SYST BP LT 130 MM HG: CPT | Performed by: INTERNAL MEDICINE

## 2023-01-01 PROCEDURE — 96374 THER/PROPH/DIAG INJ IV PUSH: CPT

## 2023-01-01 PROCEDURE — 73501 X-RAY EXAM HIP UNI 1 VIEW: CPT | Mod: LT

## 2023-01-01 PROCEDURE — 99214 OFFICE O/P EST MOD 30 MIN: CPT | Mod: 25 | Performed by: INTERNAL MEDICINE

## 2023-01-01 PROCEDURE — 85025 COMPLETE CBC W/AUTO DIFF WBC: CPT | Mod: 91

## 2023-01-01 PROCEDURE — 51702 INSERT TEMP BLADDER CATH: CPT

## 2023-01-01 PROCEDURE — 90471 IMMUNIZATION ADMIN: CPT

## 2023-01-01 PROCEDURE — 304999 HCHG REPAIR-SIMPLE/INTERMED LEVEL 1

## 2023-01-01 PROCEDURE — 700101 HCHG RX REV CODE 250: Performed by: HOSPITALIST

## 2023-01-01 PROCEDURE — 700102 HCHG RX REV CODE 250 W/ 637 OVERRIDE(OP): Performed by: HOSPITALIST

## 2023-01-01 PROCEDURE — 304217 HCHG IRRIGATION SYSTEM

## 2023-01-01 PROCEDURE — 73030 X-RAY EXAM OF SHOULDER: CPT | Mod: LT

## 2023-01-01 PROCEDURE — 99211 OFF/OP EST MAY X REQ PHY/QHP: CPT | Performed by: INTERNAL MEDICINE

## 2023-01-01 RX ORDER — AMOXICILLIN 250 MG
2 CAPSULE ORAL 2 TIMES DAILY
Status: DISCONTINUED | OUTPATIENT
Start: 2023-01-01 | End: 2023-01-01 | Stop reason: HOSPADM

## 2023-01-01 RX ORDER — SODIUM CHLORIDE 9 MG/ML
1000 INJECTION, SOLUTION INTRAVENOUS ONCE
Status: COMPLETED | OUTPATIENT
Start: 2023-01-01 | End: 2023-01-01

## 2023-01-01 RX ORDER — HEPARIN SODIUM 5000 [USP'U]/ML
5000 INJECTION, SOLUTION INTRAVENOUS; SUBCUTANEOUS EVERY 8 HOURS
Status: DISCONTINUED | OUTPATIENT
Start: 2023-01-01 | End: 2023-01-01 | Stop reason: HOSPADM

## 2023-01-01 RX ORDER — FUROSEMIDE 20 MG/1
20 TABLET ORAL 2 TIMES DAILY
COMMUNITY

## 2023-01-01 RX ORDER — ZINC OXIDE 22 G/100G
1 CREAM TOPICAL
COMMUNITY
End: 2023-01-01

## 2023-01-01 RX ORDER — FUROSEMIDE 20 MG/1
20 TABLET ORAL DAILY
Status: DISCONTINUED | OUTPATIENT
Start: 2023-01-01 | End: 2023-01-01 | Stop reason: HOSPADM

## 2023-01-01 RX ORDER — ACETAMINOPHEN 325 MG/1
325 TABLET ORAL EVERY 6 HOURS PRN
COMMUNITY

## 2023-01-01 RX ORDER — SPIRONOLACTONE 25 MG/1
25 TABLET ORAL DAILY
Qty: 90 TABLET | Refills: 1 | Status: ON HOLD | OUTPATIENT
Start: 2023-01-01 | End: 2023-01-01 | Stop reason: SDUPTHER

## 2023-01-01 RX ORDER — SODIUM CHLORIDE, SODIUM LACTATE, POTASSIUM CHLORIDE, CALCIUM CHLORIDE 600; 310; 30; 20 MG/100ML; MG/100ML; MG/100ML; MG/100ML
1000 INJECTION, SOLUTION INTRAVENOUS ONCE
Status: COMPLETED | OUTPATIENT
Start: 2023-01-01 | End: 2023-01-01

## 2023-01-01 RX ORDER — ACETAMINOPHEN 325 MG/1
650 TABLET ORAL EVERY 6 HOURS PRN
Status: DISCONTINUED | OUTPATIENT
Start: 2023-01-01 | End: 2023-01-01 | Stop reason: HOSPADM

## 2023-01-01 RX ORDER — SPIRONOLACTONE 25 MG/1
25 TABLET ORAL
Qty: 30 TABLET | Refills: 0 | Status: SHIPPED | OUTPATIENT
Start: 2023-01-01 | End: 2023-01-01

## 2023-01-01 RX ORDER — SODIUM HYPOCHLORITE 5 MG/ML
1 SOLUTION TOPICAL DAILY
COMMUNITY
End: 2023-01-01

## 2023-01-01 RX ORDER — LINEZOLID 600 MG/1
600 TABLET, FILM COATED ORAL EVERY 12 HOURS
Status: DISCONTINUED | OUTPATIENT
Start: 2023-01-01 | End: 2023-01-01

## 2023-01-01 RX ORDER — ACETAMINOPHEN 650 MG/1
650 SUPPOSITORY RECTAL ONCE
Status: COMPLETED | OUTPATIENT
Start: 2023-01-01 | End: 2023-01-01

## 2023-01-01 RX ORDER — GENTAMICIN SULFATE 1 MG/G
1 CREAM TOPICAL DAILY
COMMUNITY

## 2023-01-01 RX ORDER — SPIRONOLACTONE 25 MG/1
25 TABLET ORAL 2 TIMES DAILY
COMMUNITY
Start: 2023-01-01 | End: 2023-01-01

## 2023-01-01 RX ORDER — FUROSEMIDE 20 MG/1
20 TABLET ORAL
Qty: 30 TABLET | Refills: 0 | Status: SHIPPED | OUTPATIENT
Start: 2023-01-01 | End: 2023-01-01

## 2023-01-01 RX ORDER — CEPHALEXIN 500 MG/1
500 CAPSULE ORAL 4 TIMES DAILY
Qty: 28 CAPSULE | Refills: 0 | Status: SHIPPED | OUTPATIENT
Start: 2023-01-01 | End: 2023-01-01

## 2023-01-01 RX ORDER — POLYETHYLENE GLYCOL 3350 17 G/17G
1 POWDER, FOR SOLUTION ORAL
Status: DISCONTINUED | OUTPATIENT
Start: 2023-01-01 | End: 2023-01-01 | Stop reason: HOSPADM

## 2023-01-01 RX ORDER — SODIUM CHLORIDE, SODIUM LACTATE, POTASSIUM CHLORIDE, AND CALCIUM CHLORIDE .6; .31; .03; .02 G/100ML; G/100ML; G/100ML; G/100ML
500 INJECTION, SOLUTION INTRAVENOUS
Status: DISCONTINUED | OUTPATIENT
Start: 2023-01-01 | End: 2023-01-01

## 2023-01-01 RX ORDER — FUROSEMIDE 20 MG/1
20 TABLET ORAL DAILY
Qty: 10 TABLET | Refills: 0 | Status: SHIPPED | OUTPATIENT
Start: 2023-01-01 | End: 2023-01-01

## 2023-01-01 RX ORDER — ZINC OXIDE 20 %
1 OINTMENT (GRAM) TOPICAL DAILY
COMMUNITY

## 2023-01-01 RX ORDER — SPIRONOLACTONE 25 MG/1
25 TABLET ORAL 2 TIMES DAILY
COMMUNITY

## 2023-01-01 RX ORDER — LINEZOLID 2 MG/ML
600 INJECTION, SOLUTION INTRAVENOUS ONCE
Status: COMPLETED | OUTPATIENT
Start: 2023-01-01 | End: 2023-01-01

## 2023-01-01 RX ORDER — CIPROFLOXACIN 500 MG/1
500 TABLET, FILM COATED ORAL 2 TIMES DAILY
COMMUNITY

## 2023-01-01 RX ORDER — SPIRONOLACTONE 25 MG/1
25 TABLET ORAL DAILY
Status: DISCONTINUED | OUTPATIENT
Start: 2023-01-01 | End: 2023-01-01 | Stop reason: HOSPADM

## 2023-01-01 RX ORDER — SODIUM CHLORIDE, SODIUM LACTATE, POTASSIUM CHLORIDE, CALCIUM CHLORIDE 600; 310; 30; 20 MG/100ML; MG/100ML; MG/100ML; MG/100ML
1000 INJECTION, SOLUTION INTRAVENOUS CONTINUOUS
Status: DISCONTINUED | OUTPATIENT
Start: 2023-01-01 | End: 2023-01-01

## 2023-01-01 RX ORDER — CEFTRIAXONE 1 G/1
1000 INJECTION, POWDER, FOR SOLUTION INTRAMUSCULAR; INTRAVENOUS ONCE
Status: COMPLETED | OUTPATIENT
Start: 2023-01-01 | End: 2023-01-01

## 2023-01-01 RX ORDER — SODIUM HYPOCHLORITE 1.25 MG/ML
5-10 SOLUTION TOPICAL DAILY
Status: DISCONTINUED | OUTPATIENT
Start: 2023-01-01 | End: 2023-01-01 | Stop reason: HOSPADM

## 2023-01-01 RX ORDER — LIDOCAINE HYDROCHLORIDE AND EPINEPHRINE 10; 10 MG/ML; UG/ML
20 INJECTION, SOLUTION INFILTRATION; PERINEURAL ONCE
Status: DISCONTINUED | OUTPATIENT
Start: 2023-01-01 | End: 2023-01-01

## 2023-01-01 RX ORDER — AZITHROMYCIN 500 MG/1
500 INJECTION, POWDER, LYOPHILIZED, FOR SOLUTION INTRAVENOUS ONCE
Status: DISCONTINUED | OUTPATIENT
Start: 2023-01-01 | End: 2023-01-01

## 2023-01-01 RX ORDER — BISACODYL 10 MG
10 SUPPOSITORY, RECTAL RECTAL
Status: DISCONTINUED | OUTPATIENT
Start: 2023-01-01 | End: 2023-01-01 | Stop reason: HOSPADM

## 2023-01-01 RX ORDER — FUROSEMIDE 20 MG/1
20 TABLET ORAL DAILY
Qty: 90 TABLET | Refills: 3 | Status: ON HOLD | OUTPATIENT
Start: 2023-01-01 | End: 2023-01-01 | Stop reason: SDUPTHER

## 2023-01-01 RX ORDER — AMOXICILLIN AND CLAVULANATE POTASSIUM 875; 125 MG/1; MG/1
1 TABLET, FILM COATED ORAL 2 TIMES DAILY
Qty: 8 TABLET | Refills: 0 | Status: ACTIVE | OUTPATIENT
Start: 2023-01-01 | End: 2023-01-01

## 2023-01-01 RX ADMIN — HEPARIN SODIUM 5000 UNITS: 5000 INJECTION, SOLUTION INTRAVENOUS; SUBCUTANEOUS at 06:30

## 2023-01-01 RX ADMIN — HEPARIN SODIUM 5000 UNITS: 5000 INJECTION, SOLUTION INTRAVENOUS; SUBCUTANEOUS at 21:42

## 2023-01-01 RX ADMIN — HEPARIN SODIUM 5000 UNITS: 5000 INJECTION, SOLUTION INTRAVENOUS; SUBCUTANEOUS at 04:43

## 2023-01-01 RX ADMIN — ACETAMINOPHEN 650 MG: 650 SUPPOSITORY RECTAL at 18:30

## 2023-01-01 RX ADMIN — SODIUM CHLORIDE 1000 ML: 9 INJECTION, SOLUTION INTRAVENOUS at 18:33

## 2023-01-01 RX ADMIN — HEPARIN SODIUM 5000 UNITS: 5000 INJECTION, SOLUTION INTRAVENOUS; SUBCUTANEOUS at 14:12

## 2023-01-01 RX ADMIN — SODIUM CHLORIDE 1 G: 900 INJECTION INTRAVENOUS at 20:34

## 2023-01-01 RX ADMIN — SODIUM CHLORIDE, POTASSIUM CHLORIDE, SODIUM LACTATE AND CALCIUM CHLORIDE 1000 ML: 600; 310; 30; 20 INJECTION, SOLUTION INTRAVENOUS at 12:35

## 2023-01-01 RX ADMIN — LINEZOLID 600 MG: 600 TABLET, FILM COATED ORAL at 06:29

## 2023-01-01 RX ADMIN — HEPARIN SODIUM 5000 UNITS: 5000 INJECTION, SOLUTION INTRAVENOUS; SUBCUTANEOUS at 22:26

## 2023-01-01 RX ADMIN — LINEZOLID 600 MG: 600 INJECTION, SOLUTION INTRAVENOUS at 19:54

## 2023-01-01 RX ADMIN — DAKIN'S SOLUTION 0.125% (QUARTER STRENGTH) 5 ML: 0.12 SOLUTION at 05:29

## 2023-01-01 RX ADMIN — CEFTRIAXONE SODIUM 1000 MG: 1 INJECTION, POWDER, FOR SOLUTION INTRAMUSCULAR; INTRAVENOUS at 18:30

## 2023-01-01 RX ADMIN — SODIUM CHLORIDE, POTASSIUM CHLORIDE, SODIUM LACTATE AND CALCIUM CHLORIDE 1000 ML: 600; 310; 30; 20 INJECTION, SOLUTION INTRAVENOUS at 21:01

## 2023-01-01 RX ADMIN — CLOSTRIDIUM TETANI TOXOID ANTIGEN (FORMALDEHYDE INACTIVATED), CORYNEBACTERIUM DIPHTHERIAE TOXOID ANTIGEN (FORMALDEHYDE INACTIVATED), BORDETELLA PERTUSSIS TOXOID ANTIGEN (GLUTARALDEHYDE INACTIVATED), BORDETELLA PERTUSSIS FILAMENTOUS HEMAGGLUTININ ANTIGEN (FORMALDEHYDE INACTIVATED), BORDETELLA PERTUSSIS PERTACTIN ANTIGEN, AND BORDETELLA PERTUSSIS FIMBRIAE 2/3 ANTIGEN 0.5 ML: 5; 2; 2.5; 5; 3; 5 INJECTION, SUSPENSION INTRAMUSCULAR at 15:54

## 2023-01-01 RX ADMIN — CEFTRIAXONE SODIUM 1000 MG: 1 INJECTION, POWDER, FOR SOLUTION INTRAMUSCULAR; INTRAVENOUS at 17:54

## 2023-01-01 ASSESSMENT — COGNITIVE AND FUNCTIONAL STATUS - GENERAL
CLIMB 3 TO 5 STEPS WITH RAILING: TOTAL
EATING MEALS: A LITTLE
TURNING FROM BACK TO SIDE WHILE IN FLAT BAD: A LITTLE
MOBILITY SCORE: 11
STANDING UP FROM CHAIR USING ARMS: TOTAL
WALKING IN HOSPITAL ROOM: A LOT
PERSONAL GROOMING: A LITTLE
HELP NEEDED FOR BATHING: TOTAL
DRESSING REGULAR UPPER BODY CLOTHING: A LOT
SUGGESTED CMS G CODE MODIFIER MOBILITY: CL
TOILETING: TOTAL
DRESSING REGULAR LOWER BODY CLOTHING: TOTAL
SUGGESTED CMS G CODE MODIFIER DAILY ACTIVITY: CL
MOVING TO AND FROM BED TO CHAIR: A LOT
DAILY ACTIVITIY SCORE: 11
MOVING FROM LYING ON BACK TO SITTING ON SIDE OF FLAT BED: A LOT

## 2023-01-01 ASSESSMENT — PAIN SCALES - PAIN ASSESSMENT IN ADVANCED DEMENTIA (PAINAD)
CONSOLABILITY: NO NEED TO CONSOLE
TOTALSCORE: 1
BREATHING: NORMAL
FACIALEXPRESSION: SMILING OR INEXPRESSIVE
FACIALEXPRESSION: SMILING OR INEXPRESSIVE
TOTALSCORE: 0
CONSOLABILITY: NO NEED TO CONSOLE
BODYLANGUAGE: RELAXED
BODYLANGUAGE: RELAXED
BREATHING: OCCASIONAL LABORED BREATHING, SHORT PERIOD OF HYPERVENTILATION

## 2023-01-01 ASSESSMENT — FIBROSIS 4 INDEX
FIB4 SCORE: 3.21
FIB4 SCORE: 3.21
FIB4 SCORE: 2.85
FIB4 SCORE: 3.04
FIB4 SCORE: 3.21
FIB4 SCORE: 2.09
FIB4 SCORE: 2.53
FIB4 SCORE: 2.31

## 2023-01-01 ASSESSMENT — PAIN DESCRIPTION - PAIN TYPE
TYPE: ACUTE PAIN

## 2023-01-01 ASSESSMENT — PATIENT HEALTH QUESTIONNAIRE - PHQ9
SUM OF ALL RESPONSES TO PHQ9 QUESTIONS 1 AND 2: 0
1. LITTLE INTEREST OR PLEASURE IN DOING THINGS: NOT AT ALL
2. FEELING DOWN, DEPRESSED, IRRITABLE, OR HOPELESS: NOT AT ALL

## 2023-01-01 ASSESSMENT — ENCOUNTER SYMPTOMS
FEVER: 1
FALLS: 1

## 2023-01-01 ASSESSMENT — LIFESTYLE VARIABLES: DO YOU DRINK ALCOHOL: NO

## 2023-01-13 NOTE — FACE TO FACE
Face to Face Supporting Documentation - Home Health    The encounter with this patient was in whole or in part the primary reason for home health admission.    Date of encounter:   Patient:                    MRN:                       YOB: 2023  Abhilash Rios  [id number]  7/24/1929     Home health to see patient for:  Skilled Nursing care for assessment, interventions & education, Home health aide, Physical Therapy evaluation and treatment, and Occupational therapy evaluation and treatment    Skilled need for:  Comment: failure to thrive, weakness    Skilled nursing interventions to include:  Comment: meds management, po intake    Homebound status evidenced by:  Needs the assistance of another person in order to leave the home. Leaving home requires a considerable and taxing effort. There is a normal inability to leave the home.    Community Physician to provide follow up care: James Avila M.D.     Optional Interventions? No      I certify the face to face encounter for this home health care referral meets the CMS requirements and the encounter/clinical assessment with the patient was, in whole, or in part, for the medical condition(s) listed above, which is the primary reason for home health care. Based on my clinical findings: the service(s) are medically necessary, support the need for home health care, and the homebound criteria are met.  I certify that this patient has had a face to face encounter by myself.  Mera Ivy D.O. - NPI: [id number]

## 2023-01-13 NOTE — DISCHARGE PLANNING
"  Received referral from Dr. Ivy for disposition assistance. Per MD, pt is medically stable to go home.       Met with pt and brother Fuentes. Fuentes said that pt lives alone ,is independent with ADLs and uses a walking stick when he goes outside of the house. Basically independent when inside the house without any assistive devices. Pt lives in a two level home but stay on one level and has all the utilities there. Fuentes helps with food, laundry and housekeeping daily. They have been looking for assisted living.  gave them an extensive GH list as well. Explained that GH are cheaper. Fuentes said \"he can handle the cost\". \"Its time to get him some help\".     Fuentes signed the homehealth choice as well. [facility].    PCP is Nima spear. [facility].     Care Transition Team Assessment    Information Source  Orientation Level: Oriented X4  Information Given By: Patient, Other (Comments)  Informant's Name: Brothialn Dill  Who is responsible for making decisions for patient? : Patient    Readmission Evaluation  Is this a readmission?: No    Elopement Risk  Legal Hold: No  Ambulatory or Self Mobile in Wheelchair: No-Not an Elopement Risk    Interdisciplinary Discharge Planning  Does Admitting Nurse Feel This Could be a Complex Discharge?: No  Primary Care Physician: Dr. Sophie Garrett  Lives with - Patient's Self Care Capacity: Alone and Unable to Care For Self  Patient or legal guardian wants to designate a caregiver: No  Support Systems: Family Member(s) (Brother Fuentes checks on him daily)  Housing / Facility: 2 Story House (pt has facilities on one level)  Do You Take your Prescribed Medications Regularly: Yes  Able to Return to Previous ADL's: Future Time w/Therapy  Mobility Issues: Yes  Prior Services: Home-Independent  Patient Prefers to be Discharged to:: Homehealth  Assistance Needed: Yes  Durable Medical " Equipment: Other - Specify (walking stick)    Discharge Preparedness  What is your plan after discharge?: Home health care  What are your discharge supports?: Sibling  Prior Functional Level: Ambulatory, Independent with Activities of Daily Living, Independent with Medication Management  Difficulity with ADLs: Bathing, Walking, Eating  Difficulity with IADLs: Cooking, Driving, Laundry, Managing medication, Shopping    Functional Assesment  Prior Functional Level: Ambulatory, Independent with Activities of Daily Living, Independent with Medication Management    Finances  Financial Barriers to Discharge: No  Prescription Coverage: Yes    Vision / Hearing Impairment  Vision Impairment : Yes  Hearing Impairment : No    Values / Beliefs / Concerns  Values / Beliefs Concerns : No    Advance Directive  Advance Directive?: Living Will    Domestic Abuse  Have you ever been the victim of abuse or violence?: No         Discharge Risks or Barriers  Discharge risks or barriers?: No  Patient risk factors: Vulnerable adult    Anticipated Discharge Information  Discharge Disposition: D/T to home under HHA care in anticipation of covered skilled care (06)

## 2023-01-13 NOTE — ED PROVIDER NOTES
ED Provider Note    CHIEF COMPLAINT  Chief Complaint   Patient presents with    Failure to Thrive     Past 8-9 days. Pt doesn't want to get out of bed. Eating and drinking less. Endorses runny nose. No other complaints from patient. Brother at bedside states patient has swollen scrotum.        EXTERNAL RECORDS REVIEWED  Outpatient Notes      Has been seen in the outpatient clinic, by Dr. Godinez cardiac electrophysiology, he has a history of a in SVT, AF, he is anticoagulated on Eliquis, he has had an ablation in the past as well as a TIA.  He has a pacemaker secondary to complete heart block    HPI/ROS  LIMITATION TO HISTORY   Select: Mild dementia  OUTSIDE HISTORIAN(S):  Family younger brother    Abhilash Rios is a 89 y.o. male who presents to the Emergency Department accompanied by his younger brother with a chief complaint of not eating.  Patient reports that he simply not interested.  He is not nauseated he is not having diarrhea he is not vomiting.  He lives alone and has typically been independent of his ADLs but his brother and his wife, check on him frequently and note that they have been over to the house more recently because they are concerned about him not eating and not getting out of bed.  RAND was out to the house on Wednesday but he seemed to check out fine he was not transferred for evaluation.  His brother notes that he does have some mild dementia.  Patient complains of having nasal congestion.  He is overall generally feels weak and states that he is afraid to walk because he is afraid he is going to fall.  He has a walking stick at home.  His brothers reports that he has refused to use a cane or walker.  He denies any pain.  No headache, chest pain, abdominal pain or back pain.  He fell 3 months ago but has not fallen since.  He only takes Eliquis as far as medication.    PAST MEDICAL HISTORY   has a past medical history of Atrial flutter (HCC) (3/8/2012), Dizziness (3/8/2012), Macular  "degeneration (3/8/2012), Pacemaker, Pre-syncope (3/8/2012), Presence of permanent cardiac pacemaker (3/8/2012), SSS (sick sinus syndrome) (HCC) (3/8/2012), TIA (transient ischemic attack) (3/8/2012), and Vertigo (3/8/2012).    SURGICAL HISTORY   has a past surgical history that includes eye surgery; other orthopedic surgery; rotator cuff repair; and trans urethral resection prostate.    FAMILY HISTORY  Family History   Problem Relation Age of Onset    Non-contributory Other        SOCIAL HISTORY  Social History     Tobacco Use    Smoking status: Never    Smokeless tobacco: Never   Vaping Use    Vaping Use: Never used   Substance and Sexual Activity    Alcohol use: No    Drug use: No    Sexual activity: Not on file       CURRENT MEDICATIONS  Home Medications       Reviewed by Durga Woods R.N. (Registered Nurse) on 01/13/23 at 1118  Med List Status: Not Addressed     Medication Last Dose Status   apixaban (ELIQUIS) 2.5mg Tab  Active   Cholecalciferol 1000 UNIT Cap  Active   triamcinolone acetonide (KENALOG) 0.1 % Cream  Active                    ALLERGIES  Allergies   Allergen Reactions    Sulfa Drugs Anaphylaxis    Sulfasalazine Anaphylaxis       PHYSICAL EXAM  VITAL SIGNS: /78   Pulse 72   Temp 36.3 °C (97.4 °F) (Temporal)   Resp 18   Ht 1.702 m (5' 7\")   Wt 53.2 kg (117 lb 4.6 oz)   SpO2 98%   BMI 18.37 kg/m²    Vitals reviewed.  Constitutional: Patient is oriented to person, place, and time. Appears well-developed and well-nourished. No distress.    Head: Normocephalic and atraumatic.   Ears: Normal external ears bilaterally.   Mouth/Throat: Oropharynx is clear, dry mucous membranes.  Eyes: Conjunctivae are normal. Pupils are equal, round.  Neck: Normal range of motion. Neck supple.  Cardiovascular: Normal rate, regular rhythm and normal heart sounds. Normal peripheral pulses.  Pulmonary/Chest: Effort normal and breath sounds normal. No respiratory distress, no wheezes  Abdominal: Soft. Bowel " sounds are normal. There is no tenderness, rebound or guarding, or peritoneal signs, no masses. No CVA tenderness.  : Uncircumcised, no scrotal swelling or erythema.  Normal male genitalia  Musculoskeletal: No edema and no tenderness.   Neurological: No cranial nerve deficits. Normal motor and sensory exam. No focal deficits.   Skin: Skin is warm and dry. No erythema. No pallor.   Psychiatric: Patient has a normal mood and affect.     DIAGNOSTIC STUDIES / PROCEDURES    LABS  Results for orders placed or performed during the hospital encounter of 01/13/23   CBC WITH DIFFERENTIAL   Result Value Ref Range    WBC 4.4 (L) 4.8 - 10.8 K/uL    RBC 5.17 4.70 - 6.10 M/uL    Hemoglobin 15.8 14.0 - 18.0 g/dL    Hematocrit 47.0 42.0 - 52.0 %    MCV 90.9 81.4 - 97.8 fL    MCH 30.6 27.0 - 33.0 pg    MCHC 33.6 (L) 33.7 - 35.3 g/dL    RDW 45.1 35.9 - 50.0 fL    Platelet Count 156 (L) 164 - 446 K/uL    MPV 11.0 9.0 - 12.9 fL    Neutrophils-Polys 45.40 44.00 - 72.00 %    Lymphocytes 37.10 22.00 - 41.00 %    Monocytes 12.30 0.00 - 13.40 %    Eosinophils 3.90 0.00 - 6.90 %    Basophils 1.10 0.00 - 1.80 %    Immature Granulocytes 0.20 0.00 - 0.90 %    Nucleated RBC 0.00 /100 WBC    Neutrophils (Absolute) 1.99 1.82 - 7.42 K/uL    Lymphs (Absolute) 1.63 1.00 - 4.80 K/uL    Monos (Absolute) 0.54 0.00 - 0.85 K/uL    Eos (Absolute) 0.17 0.00 - 0.51 K/uL    Baso (Absolute) 0.05 0.00 - 0.12 K/uL    Immature Granulocytes (abs) 0.01 0.00 - 0.11 K/uL    NRBC (Absolute) 0.00 K/uL   CMP   Result Value Ref Range    Sodium 136 135 - 145 mmol/L    Potassium 4.4 3.6 - 5.5 mmol/L    Chloride 98 96 - 112 mmol/L    Co2 24 20 - 33 mmol/L    Anion Gap 14.0 7.0 - 16.0    Glucose 88 65 - 99 mg/dL    Bun 32 (H) 8 - 22 mg/dL    Creatinine 1.57 (H) 0.50 - 1.40 mg/dL    Calcium 9.3 8.4 - 10.2 mg/dL    AST(SGOT) 22 12 - 45 U/L    ALT(SGPT) 11 2 - 50 U/L    Alkaline Phosphatase 57 30 - 99 U/L    Total Bilirubin 0.7 0.1 - 1.5 mg/dL    Albumin 4.4 3.2 - 4.9 g/dL     Total Protein 7.7 6.0 - 8.2 g/dL    Globulin 3.3 1.9 - 3.5 g/dL    A-G Ratio 1.3 g/dL   URINALYSIS    Specimen: Urine, Clean Catch; Respirate   Result Value Ref Range    Color Yellow     Character Clear     Specific Gravity >=1.030 <1.035    Ph 5.5 5.0 - 8.0    Glucose Negative Negative mg/dL    Ketones 15 (A) Negative mg/dL    Protein Negative Negative mg/dL    Bilirubin Negative Negative    Nitrite Negative Negative    Leukocyte Esterase Negative Negative    Occult Blood Negative Negative    Micro Urine Req see below    COV-2, FLU A/B, AND RSV BY PCR (2-4 HOURS CEPHEID): Collect NP swab in VTM    Specimen: Nasopharyngeal; Respirate   Result Value Ref Range    Influenza virus A RNA Negative Negative    Influenza virus B, PCR Negative Negative    RSV, PCR Negative Negative    SARS-CoV-2 by PCR NotDetected     SARS-CoV-2 Source Nasal Swab    CORRECTED CALCIUM   Result Value Ref Range    Correct Calcium 9.0 8.5 - 10.5 mg/dL   ESTIMATED GFR   Result Value Ref Range    GFR (CKD-EPI) 41 (A) >60 mL/min/1.73 m 2       COURSE & MEDICAL DECISION MAKING    ED Observation Status? Yes; I am placing the patient in to an observation status due to a diagnostic uncertainty as well as therapeutic intensity. Patient placed in observation status at 10:54 PM, 1/13/2023.     INITIAL ASSESSMENT AND PLAN  Care Narrative:     This is a pleasant elderly male.  He presents with a family member.  Historically he has lived independently.  But his brother notes that he is required more attention recently.  There is high concern, that he is not taking in enough fluids, he is generally weak and is not thriving.  Patient does not have complaints of pain.  He is not vomiting or having a fever.  He is not sure why he is eating so little.  He simply states he is not interested.  And he does admit that he is not getting up as much because he is afraid to fall at home.    1:30 PM data reviewed.  Patient's labs overall are reassuring.  His kidney  function, creatinine is elevated 1.57, previously normal, 1.25, this is likely related to his decreased oral intake.  Electrolytes are normal, bicarbonate and anion gap are normal.  LFTs are normal.  There is no elevated white blood cell count.  He is not anemic.  Urine analysis shows ketones but was otherwise negative for infection, influenza RSV and COVID testing active.  I have spoken with Annmarie, care coordinator regarding arranging PT, OT, home health.    Patient ambulated here in the ED.  He is eating spaghetti.  I anticipate discharge to home once IV fluids are infused and both patient and family are agreeable.  Patient eager to go home.    ADDITIONAL PROBLEM LIST AND DISPOSITION    Discussion of management with other QHP or appropriate source(s): Case Management to assist with setting up HH and PT/OT      Escalation of care considered, and ultimately not performed: blood analysis.     Barriers to care at this time, including but not limited to:  age, motivation, fear .     Decision tools and prescription drugs considered including, but not limited to:  None .    HYDRATION: Based on the patient's presentation of Dehydration and Other decreased oral intake, dehydration the patient was given IV fluids. IV Hydration was used because oral hydration was not adequate alone. Upon recheck following hydration, the patient was improved.        FINAL DIAGNOSIS  1. Failure to thrive in adult    2. Generalized weakness           Electronically signed by: Mera Ivy D.O., 1/13/2023 1:25 PM

## 2023-01-13 NOTE — DISCHARGE PLANNING
Received Choice form at [id number]  Agency/Facility Name: [facility]  Referral sent per Choice form @ [phone]

## 2023-01-13 NOTE — ED TRIAGE NOTES
"Bib brother for above complaints.     Chief Complaint   Patient presents with    Failure to Thrive     Past 8-9 days. Pt doesn't want to get out of bed. Eating and drinking less. Endorses runny nose. No other complaints from patient. Brother at bedside states patient has swollen scrotum.      /89   Pulse 81   Temp 36.3 °C (97.4 °F) (Temporal)   Resp 18   Ht 1.702 m (5' 7\")   Wt 53.2 kg (117 lb 4.6 oz)   SpO2 92%   BMI 18.37 kg/m²     "

## 2023-01-14 NOTE — DISCHARGE PLANNING
note:  LVM for [facility] HH again and to their covering liaison Sukh hu RN for [facility].     Addendum: Shelton RUIZ from [facility] called and she said that pt has been declined because brother wanted a  for pt and declined [facility]. Shelton said that their clinical manager talked to pt's brother and he said that pt needs someone to clean the house and be a  for pt.

## 2023-01-14 NOTE — DISCHARGE PLANNING
note:  TOM called brother Fuentes and explained to him what Shelton from [facility] said. He agrees that pt needs a  and not a nurse and therapist. He said he has an appt with an assisted living on [facility] tomorrow and he will assist his brother to move in to the facility asa. He is going to care for his brother today.

## 2023-04-27 NOTE — DISCHARGE INSTRUCTIONS
Follow-up with primary care as scheduled next week for reevaluation, medication management and repeat lab test/renal monitoring as indicated and referral to wound care if peripheral edema and weeping persist.    Continue home medications as previously indicated.  Add Lasix daily for edema/swelling.  Add Keflex 4 times daily for 7 days for cellulitis.    Diet and activity per routine.  Low-salt diet recommended.    Return to the emergency department for persistent or worsening lower extremity swelling, redness, weeping, fever or other new concerns.

## 2023-04-27 NOTE — ED NOTES
Vital signs taken and recorded. IV removed. Discharge in stable. Health teachings given to patient and family with full understanding of the information given. No personal belongings left.

## 2023-04-27 NOTE — ED TRIAGE NOTES
Pt brought from [facility] by EMS for concerns of BLE swelling with non-healing wounds. Per EMS the family and staff have been trying to get in touch with the PCP with no success, so they decided to call EMS

## 2023-04-27 NOTE — ED NOTES
Family and pt updated on plan to DC home   [facility] eta 2215 updated on POC  [facility] called updated on pt eta   Sarah at facility stated he would be there to accept the pt back   ERP spoke to sarah

## 2023-04-27 NOTE — ED NOTES
As per RAND arrival will be delayed. Pt's brother informed Fuentes. Agreed to send pt to [facility]. Facility called, [facility] staff Ramin aware of arrival with brother instead with RAND

## 2023-04-27 NOTE — ED PROVIDER NOTES
ED Provider Note    CHIEF COMPLAINT  Chief Complaint   Patient presents with    Peripheral Edema    Wound Check       EXTERNAL RECORDS REVIEWED  Outpatient Notes 1/13/2023 ED evaluation for failure to thrive.  Not eating.  Mild dementia.  Labs, viral studies normal although kidneys unction slightly elevated, likely secondary to decreased oral intake.  Patient ambulated in the ED, eating spaghetti.  Discharged home after IV fluids with PT/OT and home health evaluations.    HPI/ROS  LIMITATION TO HISTORY   Select: Given patient's dementia, poor historian, although his brother provides additional history  OUTSIDE HISTORIAN(S):  Family Brother    Abhilash Rios is a 89 y.o. male who presents to the emergency department by ambulance from assisted living facility for lower extremity edema, redness, weeping.  Brother, who provides much of the history due to patient's dementia, states that patient has had increasing lower extremity edema over the last 2 to 4 weeks.  He has been unable to follow-up with his primary care but he does have an appointment with Dr. Avila on May 3.  Increased lower extremity edema, now weeping and redness.  Patient denies chest pain, shortness of breath.  No history for fever, cough or sputum production.    No history for heart failure, kidney failure or chronic edema.    Patient is less active than previous since admission to this assisted living.  He does ambulate with a walker.    No medicine is Eliquis, history of pacemaker.    PAST MEDICAL HISTORY   has a past medical history of Atrial flutter (HCC) (3/8/2012), Dizziness (3/8/2012), Macular degeneration (3/8/2012), Pacemaker, Pre-syncope (3/8/2012), Presence of permanent cardiac pacemaker (3/8/2012), SSS (sick sinus syndrome) (HCC) (3/8/2012), TIA (transient ischemic attack) (3/8/2012), and Vertigo (3/8/2012).    SURGICAL HISTORY   has a past surgical history that includes eye surgery; other orthopedic surgery; rotator cuff repair; and  trans urethral resection prostate.    FAMILY HISTORY  Family History   Problem Relation Age of Onset    Non-contributory Other        SOCIAL HISTORY  Social History     Tobacco Use    Smoking status: Never    Smokeless tobacco: Never   Vaping Use    Vaping Use: Never used   Substance and Sexual Activity    Alcohol use: No    Drug use: No    Sexual activity: Not on file       CURRENT MEDICATIONS  Home Medications    **Home medications have not yet been reviewed for this encounter**         ALLERGIES  Allergies   Allergen Reactions    Sulfa Drugs Anaphylaxis    Sulfasalazine Anaphylaxis       PHYSICAL EXAM  VITAL SIGNS: /70   Pulse 72   Temp (!) 35 °C (95 °F) (Temporal)   Resp 16   SpO2 95%    Pulse ox interpretation: I interpret this pulse ox as normal.  Constitutional: Alert in no apparent distress.  HENT: Normocephalic, atraumatic. Bilateral external ears normal, Nose normal.  Slightly dry mucous membranes.    Eyes: Pupils are equal and reactive, Conjunctiva normal.   Neck: Normal range of motion, Supple,  Lymphatic: No lymphadenopathy noted.   Cardiovascular: Regular rate and rhythm. Distal pulses intact.  Bilateral lower extremity 2+ pitting peripheral edema with superficial skin breakdown, weeping and some increased erythema to the right anterior lower leg.  Crepitus.  Thorax & Lungs: Normal breath sounds.  No wheezing/rales/ronchi. No increased work of breathing  Abdomen: Soft, non-distended, non-tender to palpation.   Skin: Warm, Dry, No erythema, No rash.   Musculoskeletal: No major deformities noted.   Neurologic: Alert and oriented to self and place.  Answers some questions appropriately but does have history of dementia.  Moves 4 extremities spontaneously.  Psychiatric: Flat affect.  Cooperative.      DIAGNOSTIC STUDIES / PROCEDURES    LABS  Results for orders placed or performed during the hospital encounter of 04/26/23   CBC WITH DIFFERENTIAL   Result Value Ref Range    WBC 6.7 4.8 - 10.8 K/uL     RBC 4.06 (L) 4.70 - 6.10 M/uL    Hemoglobin 12.1 (L) 14.0 - 18.0 g/dL    Hematocrit 37.8 (L) 42.0 - 52.0 %    MCV 93.1 81.4 - 97.8 fL    MCH 29.8 27.0 - 33.0 pg    MCHC 32.0 (L) 33.7 - 35.3 g/dL    RDW 49.4 35.9 - 50.0 fL    Platelet Count 218 164 - 446 K/uL    MPV 9.6 9.0 - 12.9 fL    Neutrophils-Polys 63.70 44.00 - 72.00 %    Lymphocytes 20.60 (L) 22.00 - 41.00 %    Monocytes 9.60 0.00 - 13.40 %    Eosinophils 5.40 0.00 - 6.90 %    Basophils 0.20 0.00 - 1.80 %    Immature Granulocytes 0.50 0.00 - 0.90 %    Nucleated RBC 0.00 /100 WBC    Neutrophils (Absolute) 4.24 1.82 - 7.42 K/uL    Lymphs (Absolute) 1.37 1.00 - 4.80 K/uL    Monos (Absolute) 0.64 0.00 - 0.85 K/uL    Eos (Absolute) 0.36 0.00 - 0.51 K/uL    Baso (Absolute) 0.01 0.00 - 0.12 K/uL    Immature Granulocytes (abs) 0.03 0.00 - 0.11 K/uL    NRBC (Absolute) 0.00 K/uL   COMP METABOLIC PANEL   Result Value Ref Range    Sodium 139 135 - 145 mmol/L    Potassium 4.6 3.6 - 5.5 mmol/L    Chloride 104 96 - 112 mmol/L    Co2 23 20 - 33 mmol/L    Anion Gap 12.0 7.0 - 16.0    Glucose 90 65 - 99 mg/dL    Bun 30 (H) 8 - 22 mg/dL    Creatinine 1.42 (H) 0.50 - 1.40 mg/dL    Calcium 8.3 (L) 8.4 - 10.2 mg/dL    AST(SGOT) 17 12 - 45 U/L    ALT(SGPT) 12 2 - 50 U/L    Alkaline Phosphatase 56 30 - 99 U/L    Total Bilirubin 0.4 0.1 - 1.5 mg/dL    Albumin 3.5 3.2 - 4.9 g/dL    Total Protein 6.6 6.0 - 8.2 g/dL    Globulin 3.1 1.9 - 3.5 g/dL    A-G Ratio 1.1 g/dL   APTT   Result Value Ref Range    APTT 30.5 24.7 - 36.0 sec   PROTHROMBIN TIME   Result Value Ref Range    PT 15.4 (H) 12.0 - 14.6 sec    INR 1.24 (H) 0.87 - 1.13   CORRECTED CALCIUM   Result Value Ref Range    Correct Calcium 8.7 8.5 - 10.5 mg/dL   ESTIMATED GFR   Result Value Ref Range    GFR (CKD-EPI) 46 (A) >60 mL/min/1.73 m 2       COURSE & MEDICAL DECISION MAKING    ED Observation Status? No; Patient does not meet criteria for ED Observation.     INITIAL ASSESSMENT, COURSE AND PLAN  Care Narrative:   Seen  evaluated at bedside.  No acute respiratory distress.  No adventitious lung sounds.  Benign abdominal exam.  He does have lower extremity pitting edema with some early skin breakdown and erythema and weeping at the distal extremities.  Family states this been progressive over 2 to 4 weeks.  Clinically this may have been going on longer.  Only mild history for the same previously.  Only medication is Eliquis.  He does have an appointment with primary care next week.  Add CBC, chemistry.      No leukocytosis, left shift or bandemia.  No anemia.  Some mild renal insufficiency, although improved from prior evaluation without other electrolyte derangement.  No history for oliguria.  No clinical evidence for pulmonary edema, respiratory distress.    ADDITIONAL PROBLEM LIST  Dementia  Atrial flutter with pacemaker, TIA, on Eliquis    DISPOSITION AND DISCUSSIONS  ED evaluation peripheral edema does demonstrate mild cellulitis of the right distal lower extremity/anterior lower leg.  No evidence for necrotizing fasciitis, abscess.  Ancef given in the emergency department, will discharge on Keflex for 7 days.  No indication for sepsis.  Pitting edema without evidence for pulmonary vascular congestion, pulmonary edema or fluid overload otherwise.  Renal function is with mild insufficiency but improved from prior without oliguria.  We will start very low-dose Lasix as well for gentle diuresis.  Further medication recommendations and weight review can be obtained with primary care evaluation as scheduled for next week, May 3.  No indication for hospitalization.  Brother is aware of the findings and agreeable to the recommendations to discharge home at this time.    Escalation of care considered, and ultimately not performed:diagnostic imaging and acute inpatient care management, however at this time, the patient is most appropriate for outpatient management      The patient is stable for discharge home, anticipatory guidance  provided, Lasix daily, Keflex for 7 days, close follow-up is encouraged and strict return instructions have been discussed. Patient and his brother are agreeable to the disposition and plan.      FINAL DIAGNOSIS  1. Peripheral edema    2. Cellulitis of right lower extremity           Electronically signed by: Tamanna Collier D.O., 4/26/2023 9:19 PM

## 2023-05-05 NOTE — ED TRIAGE NOTES
"Chief Complaint   Patient presents with    T-5000 GLF     Pt was walking, tripped an fell hitting his head. -LOC, +thinners    Laceration     Pt has obvious lac to left eyebrow     BIB [facility] as a TBI alert for above complaint. Pt A&Ox4, GCS 15 on arrival.  PTA.     /70   Pulse 80   Temp 37.2 °C (98.9 °F) (Temporal)   Resp 15   Ht 1.702 m (5' 7\")   Wt 53.1 kg (117 lb)   SpO2 98%   BMI 18.32 kg/m²     "

## 2023-05-05 NOTE — DISCHARGE PLANNING
PIERCE asked to assist with transportation of Pt back to [place] at [facility].  PIERCE placed call to [place] and was able to confirm that he Pt does reside their but they do not have transportation availble at this time. PIERCE attempted to call Pt's Brother to get assistance with arranging transportation however he did not answer and PIERCE has been informed he is on vacation at this time.     PIERCE called T and arranged for w/c level transportation since Pt was a fall and appears to have some confusion.      T Res # [id number]    PIERCE placed call to [place] and updated Donya that the Pt has been medically cleared and is returning by T.  Donya in agreement with plan.

## 2023-05-05 NOTE — DISCHARGE INSTRUCTIONS
Sutures out in 5 to 7 days.  You can go to your primary physician, urgent care, or the emergency department for removal

## 2023-05-05 NOTE — ED PROVIDER NOTES
ED Provider Note    CHIEF COMPLAINT  Chief Complaint   Patient presents with    T-5000 GLF     Pt was walking, tripped an fell hitting his head. -LOC, +thinners    Laceration     Pt has obvious lac to left eyebrow       HPI  Abhilash Rios is a 89 y.o. male who presents after ground-level fall at his [facility] care facility.  It was unwitnessed but it was heard by staff.  He was found to have a small laceration lateral to his left eyebrow which was dressed with a bandage prior to arrival.  Patient arrives smiling, conversant, but is unable to give specifics about how he fell or why.  He does feel that he was only walking and tripped but he does not remember what he hit with his head.  He denies of having any loss of consciousness.  He denies any neck pain or back pain but does have left upper chest and left shoulder pain.  Denies any abdominal pain and denies any numbness or tingling to extremities.  EXTERNAL RECORDS REVIEWED  Reviewed last ED visit on 26 April for peripheral edema.  ROS  Constitutional: No fevers or chills  Skin: Laceration by left eye  HEENT: No sore throat, runny nose  Neck: No neck pain  Chest: Left upper chest wall pain near clavicle.  No central chest pain or pressure.  No abrasions or lacerations   Pulm: No shortness of breath, cough, wheezing, stridor, or pain with inspiration/expiration  Gastrointestinal: No nausea, vomiting, diarrhea, constipation, bloating, melena, hematochezia or abdominal pain.  Genitourinary: No dysuria or hematuria  Musculoskeletal: Pain diffusely of the left shoulder.  Patient states pain increases with range of motion of the left upper extremity.  Neurologic: No sensory or focal motor changes to extremities.  Disorientation at baseline  Heme: No bleeding or bruising problems.   Immuno: No hx of recurrent infections        LIMITATION TO HISTORY   Dementia  OUTSIDE HISTORIAN(S):  EMS who gives history in trauma bay        PAST FAM HISTORY  Family History   Problem  "Relation Age of Onset    Non-contributory Other        PAST MEDICAL HISTORY   has a past medical history of Atrial flutter (HCC) (3/8/2012), Dizziness (3/8/2012), Macular degeneration (3/8/2012), Pacemaker, Pre-syncope (3/8/2012), Presence of permanent cardiac pacemaker (3/8/2012), SSS (sick sinus syndrome) (HCC) (3/8/2012), TIA (transient ischemic attack) (3/8/2012), and Vertigo (3/8/2012).    SOCIAL HISTORY  Social History     Tobacco Use    Smoking status: Never    Smokeless tobacco: Never   Vaping Use    Vaping Use: Never used   Substance and Sexual Activity    Alcohol use: No    Drug use: No    Sexual activity: Not on file       SURGICAL HISTORY   has a past surgical history that includes eye surgery; other orthopedic surgery; rotator cuff repair; and trans urethral resection prostate.    CURRENT MEDICATIONS  Home Medications       Reviewed by Francine Arizmendi R.N. (Registered Nurse) on 05/05/23 at 1047  Med List Status: Not Addressed     Medication Last Dose Status   apixaban (ELIQUIS) 2.5mg Tab  Active   Cholecalciferol 1000 UNIT Cap  Active   furosemide (LASIX) 20 MG Tab  Active   triamcinolone acetonide (KENALOG) 0.1 % Cream  Active                     ALLERGIES  Allergies   Allergen Reactions    Sulfa Drugs Anaphylaxis    Sulfasalazine Anaphylaxis       PHYSICAL EXAM  VITAL SIGNS: /55   Pulse 69   Temp 37.2 °C (98.9 °F) (Temporal)   Resp (!) 21   Ht 1.702 m (5' 7\")   Wt 53.1 kg (117 lb)   SpO2 97%   BMI 18.32 kg/m²    Gen: Alert in no apparent distress.  Smiling  HEENT: 2 cm laceration, roughly crescent-shaped, to the left lateral eyebrow region bilateral external ears normal, Nose normal. Conjunctiva normal, Non-icteric.   Neck:  No tenderness, Supple, No masses  Lymphatic: No cervical lymphadenopathy noted.   Cardiovascular: Regular rate and noted.  Capillary refill less than 3 seconds to all extremities, 2+ distal pulses.  Thorax & Lungs: Normal breath sounds, No respiratory distress, No " wheezing bilateral chest rise  Abdomen: Bowel sounds normal, Soft, No tenderness, No masses, No pulsatile masses. No Guarding or rebound  Skin: Warm, Dry, No erythema, No rash noted to exposed areas.   Back: No bony tenderness, No CVA tenderness.   Extremities: Intact distal pulses, No edema.  Diffuse tenderness to left shoulder but no deformities, crepitus, or step-offs to shoulder or clavicle region.  Passive range of motion appears nonpainful to the patient.  Excellent  strength bilaterally.  Neurologic: Alert , no facial droop, grossly normal coordination and strength but patient limits movement of the left shoulder due to pain.  Psychiatric: Affect pleasant      INITIAL IMPRESSION  Patient arrives for evaluation of a ground-level fall and is notably on Eliquis.  He will get scanned in terms of his head and cervical spine but does not have any other significant injuries aside from left shoulder pain which I feel can be adequately evaluated by diagnostic evaluation at bedside after CT.  Laboratory evaluation will be undertaken as the patient is suffering from mild dementia and I feel these are necessary to safely discharge the patient provided the imaging is reassuring.  I very much doubt a medical etiology for his fall but, again, he has dementia and a screening evaluation to include cardiac enzymes is very reasonable.  LABS  Results for orders placed or performed during the hospital encounter of 05/05/23   CBC WITH DIFFERENTIAL   Result Value Ref Range    WBC 7.4 4.8 - 10.8 K/uL    RBC 4.35 (L) 4.70 - 6.10 M/uL    Hemoglobin 13.0 (L) 14.0 - 18.0 g/dL    Hematocrit 40.6 (L) 42.0 - 52.0 %    MCV 93.3 81.4 - 97.8 fL    MCH 29.9 27.0 - 33.0 pg    MCHC 32.0 (L) 33.7 - 35.3 g/dL    RDW 50.7 (H) 35.9 - 50.0 fL    Platelet Count 221 164 - 446 K/uL    MPV 9.4 9.0 - 12.9 fL    Neutrophils-Polys 58.00 44.00 - 72.00 %    Lymphocytes 19.10 (L) 22.00 - 41.00 %    Monocytes 9.60 0.00 - 13.40 %    Eosinophils 12.20 (H)  0.00 - 6.90 %    Basophils 0.80 0.00 - 1.80 %    Immature Granulocytes 0.30 0.00 - 0.90 %    Nucleated RBC 0.00 /100 WBC    Neutrophils (Absolute) 4.27 1.82 - 7.42 K/uL    Lymphs (Absolute) 1.41 1.00 - 4.80 K/uL    Monos (Absolute) 0.71 0.00 - 0.85 K/uL    Eos (Absolute) 0.90 (H) 0.00 - 0.51 K/uL    Baso (Absolute) 0.06 0.00 - 0.12 K/uL    Immature Granulocytes (abs) 0.02 0.00 - 0.11 K/uL    NRBC (Absolute) 0.00 K/uL   COMP METABOLIC PANEL   Result Value Ref Range    Sodium 136 135 - 145 mmol/L    Potassium 5.1 3.6 - 5.5 mmol/L    Chloride 101 96 - 112 mmol/L    Co2 23 20 - 33 mmol/L    Anion Gap 12.0 7.0 - 16.0    Glucose 95 65 - 99 mg/dL    Bun 35 (H) 8 - 22 mg/dL    Creatinine 1.47 (H) 0.50 - 1.40 mg/dL    Calcium 8.4 (L) 8.5 - 10.5 mg/dL    AST(SGOT) 19 12 - 45 U/L    ALT(SGPT) 12 2 - 50 U/L    Alkaline Phosphatase 62 30 - 99 U/L    Total Bilirubin 0.5 0.1 - 1.5 mg/dL    Albumin 3.5 3.2 - 4.9 g/dL    Total Protein 7.3 6.0 - 8.2 g/dL    Globulin 3.8 (H) 1.9 - 3.5 g/dL    A-G Ratio 0.9 g/dL   TROPONIN   Result Value Ref Range    Troponin T 63 (H) 6 - 19 ng/L   LACTIC ACID   Result Value Ref Range    Lactic Acid 1.8 0.5 - 2.0 mmol/L   URINALYSIS (UA)    Specimen: Urine   Result Value Ref Range    Color Yellow     Character Clear     Specific Gravity 1.018 <1.035    Ph 5.5 5.0 - 8.0    Glucose Negative Negative mg/dL    Ketones Negative Negative mg/dL    Protein 30 (A) Negative mg/dL    Bilirubin Negative Negative    Urobilinogen, Urine 0.2 Negative    Nitrite Negative Negative    Leukocyte Esterase Negative Negative    Occult Blood Negative Negative    Micro Urine Req Microscopic    CORRECTED CALCIUM   Result Value Ref Range    Correct Calcium 8.8 8.5 - 10.5 mg/dL   ESTIMATED GFR   Result Value Ref Range    GFR (CKD-EPI) 44 (A) >60 mL/min/1.73 m 2   URINE MICROSCOPIC (W/UA)   Result Value Ref Range    WBC 0-2 (A) /hpf    RBC 0-2 (A) /hpf    Bacteria Negative None /hpf    Epithelial Cells Negative /hpf    Hyaline  Cast 0-2 /lpf         RADIOLOGY  DX-SHOULDER 2+ LEFT   Final Result      Degenerative and postsurgical changes of left shoulder without acute fracture or malalignment.      DX-CHEST-PORTABLE (1 VIEW)   Final Result      Moderate enlargement of the cardiomediastinal silhouette without acute cardiopulmonary abnormality.      CT-CSPINE WITHOUT PLUS RECONS   Final Result      Degenerative changes of cervical spine without acute fracture or malalignment.      CT-HEAD W/O   Final Result      1. Cerebral atrophy.   2. White matter lucencies most consistent with small vessel ischemic change versus demyelination or gliosis.   3. Otherwise, Head CT without contrast with no acute findings. No evidence of acute cerebral infarction, hemorrhage or mass lesion.           I have independently interpreted the diagnostic imaging associated with this visit and am waiting the final reading from the radiologist.   My preliminary interpretation is a follows: Single view portable chest: None mild cardiomegaly noted, no pulmonary opacities to suggest infiltrates or effusions, no bony abnormalities.  2 view left shoulder: No acute fracture or deformity noted.      Laceration Repair Procedure Note    Indication: Laceration    Procedure: The patient was placed in the appropriate position and anesthesia around the laceration was obtained by infiltration using 2.0 cc of 1% Lidocaine with epinephrine. The area was then cleansed with Shur-Clens and draped in a sterile fashion, irrigated with normal saline, and explored with no foreign bodies discovered and no tendon injury noted. The laceration was closed with 5-0 Ethilon using interrupted sutures. There were no additional lacerations requiring repair. The wound area was then dressed with a sterile dressing.      Total repaired wound length: 1.5 cm.     Other Items: Suture count: 5    The patient tolerated the procedure well.    Complications: None       COURSE & MEDICAL DECISION  MAKING  Pertinent Labs & Imaging studies reviewed. (See chart for details)  ED observation? No  Patient's work-up was generally reassuring and that he did not have any convincing findings to suggest significant injury as a result of his fall.  His wound was repaired without incident.  It is notable that his troponin was mildly elevated but he had no recollection of chest pain, shortness of breath, or any other anginal equivalent and, given his age, I do not feel any further evaluation or inpatient observation is necessary.  This is likely his baseline and I feel he is safe for discharge back to his care facility.  Patient was quite pleasant throughout his stay and did not experience any neurologic deterioration.        I have discussed management of the patient with the following physicians and LEAH's: None    Escalation of care considered, and ultimately not performed:acute inpatient care management, however at this time, the patient is most appropriate for outpatient management    Barriers to care at this time, including but not limited to: Patient's underlying dementia.     Decision tools and Rx drugs considered including, but not limited to : None    Discussion of management with other QHP or appropriate source(s): None  The patient will return for worsening symptoms and is stable at the time of discharge. The patient verbalizes understanding and will comply.    FINAL IMPRESSION  1. Closed head injury, initial encounter    2. Forehead laceration, initial encounter        Electronically signed by: Janak Delgadillo M.D., 5/5/2023 12:06 PM

## 2023-05-05 NOTE — ED NOTES
The patient has been provided with discharge education and information.  The patient was also provided with instructions on follow up care and return precautions.  The patient verbalizes understanding of discharge instructions, follow up care, and return precautions.  All questions have been answered.  No RX prescribed by ERP.  NAD, A/Jack, good color and appropriate at time of discharge.  Patient wheeled out of department with GMT.    GMT transporting patient back to [facility]

## 2023-06-07 NOTE — CARDIAC REMOTE MONITOR - SCAN
Device transmission reviewed. Device demonstrated appropriate function.       Electronically Signed by: Josh Duval M.D.    6/10/2023  10:35 AM

## 2023-06-21 PROBLEM — I48.21 PERMANENT ATRIAL FIBRILLATION (HCC): Status: ACTIVE | Noted: 2023-01-01

## 2023-06-21 NOTE — PROGRESS NOTES
Chief Complaint   Patient presents with    Follow-Up     F/v Dx:NSVT (nonsustained ventricular tachycardia) ([facility])       Subjective     Abhilash Rios is a 89 y.o. male who presents today currently in assisted living.  Accompanied by his brother.  Some worsening dementia.  Worsening lower extremity edema.  On renal dose anticoagulation.  Persistent atrial fibrillation.    Past Medical History:   Diagnosis Date    Atrial flutter ([facility]) 3/8/2012    Dizziness 3/8/2012    Macular degeneration 3/8/2012    Pacemaker     Pre-syncope 3/8/2012    Presence of permanent cardiac pacemaker 3/8/2012    SSS (sick sinus syndrome) ([facility]) 3/8/2012    TIA (transient ischemic attack) 3/8/2012    Vertigo 3/8/2012     Past Surgical History:   Procedure Laterality Date    EYE SURGERY      09/02/09 Status post eye surgery for macular degeneration.    OTHER ORTHOPEDIC SURGERY      Lower Back Surgery    ROTATOR CUFF REPAIR      09/02/09 Bilateral.    TRANS URETHRAL RESECTION PROSTATE       Family History   Problem Relation Age of Onset    Non-contributory Other      Social History     Socioeconomic History    Marital status: Single     Spouse name: Not on file    Number of children: Not on file    Years of education: Not on file    Highest education level: Not on file   Occupational History    Not on file   Tobacco Use    Smoking status: Never    Smokeless tobacco: Never   Vaping Use    Vaping Use: Never used   Substance and Sexual Activity    Alcohol use: No    Drug use: No    Sexual activity: Not on file   Other Topics Concern    Not on file   Social History Narrative    Not on file     Social Determinants of Health     Financial Resource Strain: Not on file   Food Insecurity: Not on file   Transportation Needs: Not on file   Physical Activity: Not on file   Stress: Not on file   Social Connections: Not on file   Intimate Partner Violence: Not on file   Housing Stability: Not on file     Allergies   Allergen Reactions    Sulfa Drugs  "Anaphylaxis    Sulfasalazine Anaphylaxis     Outpatient Encounter Medications as of 6/21/2023   Medication Sig Dispense Refill    furosemide (LASIX) 20 MG Tab Take 1 Tablet by mouth every day. 90 Tablet 3    spironolactone (ALDACTONE) 25 MG Tab Take 1 Tablet by mouth every day. 90 Tablet 1    apixaban (ELIQUIS) 2.5mg Tab Take 1 Tablet by mouth 2 times a day. 180 Tablet 3    triamcinolone acetonide (KENALOG) 0.1 % Cream       Cholecalciferol 1000 UNIT Cap Take 1 Cap by mouth.      [DISCONTINUED] spironolactone (ALDACTONE) 25 MG Tab Take 25 mg by mouth 2 times a day.       No facility-administered encounter medications on file as of 6/21/2023.     ROS           Objective     /54 (BP Location: Left arm, Patient Position: Sitting, BP Cuff Size: Adult)   Pulse 80   Resp 18   Ht 1.702 m (5' 7\")   Wt 59.4 kg (131 lb)   SpO2 94%   BMI 20.52 kg/m²     Physical Exam  Constitutional:       Appearance: He is well-developed.   HENT:      Head: Normocephalic and atraumatic.   Cardiovascular:      Rate and Rhythm: Normal rate and regular rhythm.      Heart sounds: No murmur heard.     No friction rub. No gallop.   Pulmonary:      Effort: Pulmonary effort is normal.      Breath sounds: Normal breath sounds.   Abdominal:      Palpations: Abdomen is soft.   Musculoskeletal:         General: Normal range of motion.      Cervical back: Normal range of motion and neck supple.      Right lower leg: Edema present.      Left lower leg: Edema present.   Skin:     General: Skin is warm and dry.   Neurological:      Mental Status: He is alert and oriented to person, place, and time.   Psychiatric:         Behavior: Behavior normal.         Thought Content: Thought content normal.         Judgment: Judgment normal.                Assessment & Plan     1. Edema leg  furosemide (LASIX) 20 MG Tab    spironolactone (ALDACTONE) 25 MG Tab    Comp Metabolic Panel      2. S/P ablation of atrial flutter        3. Permanent atrial " fibrillation (HCC)        4. Anticoagulated        5. Presence of permanent cardiac pacemaker            Medical Decision Making: Today's Assessment/Status/Plan:   1.  Lower extremity edema.  Continue Aldactone 25 mg daily.  Add low-dose Lasix.  CMP next week.  2.  Persistent atrial fibrillation.  Continue renal dose Eliquis.  3.  Avoid salt and too much water.  Elevate legs.  Compression stockings.  4.  Permanent pacemaker normal function.  5.  Follow-up with me in 3 months.

## 2023-08-01 PROBLEM — J18.1 LOBAR PNEUMONIA (HCC): Status: ACTIVE | Noted: 2023-01-01

## 2023-08-01 PROBLEM — E87.1 HYPONATREMIA: Status: ACTIVE | Noted: 2023-01-01

## 2023-08-01 PROBLEM — E87.20 LACTIC ACIDOSIS: Status: ACTIVE | Noted: 2023-01-01

## 2023-08-01 PROBLEM — R65.10 SIRS (SYSTEMIC INFLAMMATORY RESPONSE SYNDROME) (HCC): Status: ACTIVE | Noted: 2023-01-01

## 2023-08-01 NOTE — ED PROVIDER NOTES
ED Provider Note    Primary care provider: James Avila M.D.  Means of arrival: EMS  History obtained from: EMS and brother  History limited by: Altered mentation    CHIEF COMPLAINT  Chief Complaint   Patient presents with    T-5000 FALL       HPI/ROS  Abhilash Rios is a 89 y.o. male who presents to the Emergency Department for evaluation of fall.  Per EMS patient lives at [facility] and he was found down at the facility with his walker on top of him, so it was assumed that he had fallen.  He was last seen prior to this 2 hours ago by nursing facility so unclear how long he had been down for.  EMS reports that he complained of lower back pain but had no other complaints.  It is unclear if he is on Eliquis still.  Per his brother Fuentes patient was on Eliquis for atrial fibrillation however it was advised that it be stopped a week ago given frequent falls.  He states he told the facility however he is uncertain if this was followed through.  He has not seen his brother in a week.  He reports at baseline he is confused and has significant dementia which is why he is at the facility.  He ambulates with a walker.  Patient is unable to provide history given baseline confusion and dementia.  He does know his name but does not know where he is or how he ended up here.    EXTERNAL RECORDS REVIEWED  Per record review patient is followed by cardiologist Dr. Godinez.  He has a history of atrial fibrillation and cardiac pacemaker.  Last saw Dr. Godinez on 6/21/2023.   Last seen in the emergency department in May 2023 for a ground-level fall.  Work-up was unremarkable, facial laceration was repaired and patient was subsequently discharged.    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    SURGICAL HISTORY   has a past surgical history that includes eye surgery; other orthopedic surgery; rotator cuff repair; and trans urethral resection prostate.    SOCIAL HISTORY  Social History     Tobacco Use    Smoking status: Never    Smokeless tobacco: Never   Vaping Use    Vaping Use: Never used   Substance Use Topics    Alcohol use: No    Drug use: No      Social History     Substance and Sexual Activity   Drug Use No       FAMILY HISTORY  Family History   Problem Relation Age of Onset    Non-contributory Other        CURRENT MEDICATIONS  See medication list      ALLERGIES  Allergies   Allergen Reactions    Sulfa Drugs Anaphylaxis    Sulfasalazine Anaphylaxis       PHYSICAL EXAM  VITAL SIGNS: /75   Pulse 82   Temp (!) 38.2 °C (100.8 °F) (Temporal)   Resp 18   SpO2 93%   Vitals reviewed by myself.  Physical Exam  Nursing note and vitals reviewed.  Constitutional: Well-developed and well-nourished.  Appears uncomfortable, stating he has to urinate  HENT: Head is normocephalic and atraumatic.  Eyes: extra-ocular movements intact  Cardiovascular: regular rate and regular rhythm. No murmur heard.  Pulmonary/Chest: Breath sounds normal. No wheezes or rales.   Abdominal: Soft and non-tender. No distention.    Musculoskeletal: Extremities exhibit normal range of motion without edema or tenderness.  No deformities noted to the extremities, bilateral legs in dressings  Neurological: Awake and alert to self.  No focal neurologic deficits.  No point tenderness to palpation of the midline spine  Skin: Skin is warm and dry. Legs in dressings, dressings removed and noted to have chronic wounds, left leg appears more erythematous and warm to the touch.  No purulent discharge from any  "wounds            DIAGNOSTIC STUDIES:  LABS  Labs Reviewed   LACTIC ACID - Abnormal; Notable for the following components:       Result Value    Lactic Acid 2.2 (*)     All other components within normal limits   COMP METABOLIC PANEL - Abnormal; Notable for the following components:    Sodium 134 (*)     Glucose 101 (*)     Bun 30 (*)     Creatinine 1.62 (*)     Globulin 3.9 (*)     All other components within normal limits   URINALYSIS - Abnormal; Notable for the following components:    Ketones Trace (*)     Occult Blood Trace (*)     All other components within normal limits    Narrative:     Indication for culture:->Emergency Room Patient   CBC WITH DIFFERENTIAL - Abnormal; Notable for the following components:    RBC 3.81 (*)     Hemoglobin 10.7 (*)     Hematocrit 34.3 (*)     MCHC 31.2 (*)     RDW 52.6 (*)     Neutrophils-Polys 76.90 (*)     Lymphocytes 13.70 (*)     All other components within normal limits   ESTIMATED GFR - Abnormal; Notable for the following components:    GFR (CKD-EPI) 39 (*)     All other components within normal limits   URINE MICROSCOPIC (W/UA) - Abnormal; Notable for the following components:    WBC Rare (*)     RBC 2-5 (*)     All other components within normal limits    Narrative:     Indication for culture:->Emergency Room Patient   CREATINE KINASE   LACTIC ACID   LACTIC ACID   URINE CULTURE(NEW)    Narrative:     Indication for culture:->Emergency Room Patient   BLOOD CULTURE    Narrative:     Per Hospital Policy: Only change Specimen Src: to \"Line\" if  specified by physician order.   BLOOD CULTURE    Narrative:     Per Hospital Policy: Only change Specimen Src: to \"Line\" if  specified by physician order.   CBC WITH DIFFERENTIAL   MRSA BY PCR (AMP)   LACTIC ACID       All labs reviewed and independently interpreted by myself    EKG Interpretation:    12 Lead EKG independently interpreted by myself to show:  EKG at 5:51 PM: Ventricular paced rhythm at a rate of 73, left axis " deviation, intervals notable for prolonged QRS of 134 consistent with paced rhythm, , QTc 475, no acute ST-T segment changes, no evidence of acute arrhythmia or ischemia per my independent interpretation    RADIOLOGY  Final interpretation by radiology demonstrates:    CT-CSPINE WITHOUT PLUS RECONS   Final Result         1. No acute fracture from C1 through T1 is visualized. Diffuse demineralization limits sensitivity         DX-CHEST-PORTABLE (1 VIEW)   Final Result         1. Left midlung opacity. Presumed pneumonia. Follow-up is advised.      CT-LSPINE W/O PLUS RECONS   Final Result      1.  No acute fracture or traumatic listhesis in the lumbar spine.   2.  Severe lumbar spondylosis.      CT-HEAD W/O   Final Result      1. No CT evidence of acute infarct, hemorrhage or mass.   2. Moderate to severe global parenchymal atrophy. Chronic small vessel ischemic changes.        The radiologist's interpretation of all radiological studies have been reviewed by me.      COURSE & MEDICAL DECISION MAKING    ED Observation Status? Yes; I am placing the patient in to an observation status due to a diagnostic uncertainty as well as therapeutic intensity. Patient placed in observation status at 4:40 PM, 8/1/2023.     Observation plan is as follows: Follow-up labs, imaging and response to treatment    Upon Reevaluation, the patient's condition has: not improved; and will be escalated to hospitalization.    Patient discharged from ED Observation status at 6:53 PM on 8/1/2023    INITIAL ASSESSMENT AND PLAN    Patient is a 89-year-old male who presents for evaluation of fall.  History limited secondary to altered mentation.  Patient noted to have mild fever on arrival.  Differential diagnosis includes urinary tract infection, dehydration, electrolyte derangement, intracranial hemorrhage, baseline debility, infection, arrhythmia.  Diagnostic work-up includes labs, EKG and CT of the head.       REASSESSMENTS   4:35 PM: Patient  seen and evaluated at bedside, patient stating he has to urinate, I tried a urinal at bedside, he was unable to urinate with this, will place Mcghee catheter as he may be retaining    4:55 PM: Spoke with family member Fuentes, brother (POA), updated on plan of care. He reports that the Eliquis was supposed to be stopped one week ago due to frequent falling, however he is uncertain if the facility actually stopped it. He reports at baseline he is very forgetful due to dementia. He gets around OK with a walker.     5:00 PM: Patient Mcghee catheter was placed and he had 1 L out immediately, likely had urinary retention, seems more comfortable now    5:06 PM: Reviewed metabolic panel, patient has JANNETTE we will hydrate for renal dysfunction    HYDRATION: Based on the patient's presentation of Acute Kidney Injury the patient was given IV fluids. IV Hydration was used because oral hydration was not adequate alone. Upon recheck following hydration, the patient was improved.      6:07 PM: Patient reassessed at bedside, mental status unchanged, not worsening    6:15 PM: Spoke with patient's brother Fuentes and updated him on plan of care    6:53 PM: Discussed the case with Dr. Law who will hospitalize patient for ongoing management    ER COURSE AND FINAL DISPOSITION   Patient's initial vitals notable for slight fever, this is a temporal we will recheck a core temperature to see if this is a true fever.  Patient is noted to complain of difficulty urinating and pain as he is unable to urinate, he his in an adult diaper which is dry.  I am concerned he may be retaining urine and therefore Mcghee catheter was placed and he had 1 L urine output immediately when this was placed.  This is consistent with urinary retention.    Labs returned and independently interpreted by myself to demonstrate:  -No leukocytosis, however patient is febrile, still concerned about fever at this time and underlying infection  -Lactic acid is 2.2, patient  is being treated with IV fluids  -Patient has acute on chronic renal injury, patient is being treated with IV fluids I believe urinary retention was likely contributing to this and Mcghee catheter is in place at this time  -Urinalysis demonstrates no signs of infection  -Hemoglobin is 10.7, at this time no signs of active bleeding  -CPK is within normal limits ruling out rhabdomyolysis as possible etiology of acute on chronic renal injury  -Labs returned and are otherwise unremarkable    Imaging returns and is notable for left-sided pneumonia.  Physical exam is also concerning for skin and soft tissue infection in the left lower extremity.  Therefore patient is empirically started on vancomycin, ceftriaxone and azithromycin for coverage of pneumonia and skin and soft tissue infection.  CT of the head and spine is negative for acute traumatic injury and intracranial hemorrhage.  At this time patient will be hospitalized for ongoing management of cellulitis, pneumonia, fever, JANNETTE.  I discussed the case with Dr. Law who has accepted patient for hospitalization.  Patient is in guarded condition.      ADDITIONAL PROBLEM LIST AND RESOURCE UTILIZATION    Additional problems aside from the chief complaint that I have addressed: none    I have discussed management of the patient with the following physicians and LEAH's: none    Discussion of management with other QHP or appropriate source(s): none     Escalation of care considered, and ultimately not performed: see above.     Barriers to care at this time, including but not limited to: none.     Decision tools and prescription drugs considered including, but not limited to: see above.    Please see review of records as noted above      FINAL IMPRESSION  1. Fever, unspecified fever cause    2. Fall, initial encounter    3. Pneumonia of left lung due to infectious organism, unspecified part of lung    4. Cellulitis of left lower extremity    5. JANNETTE (acute kidney injury)  (HCC)

## 2023-08-01 NOTE — ED TRIAGE NOTES
"Pt BIB [facility] from [facility] with c/o unwitnessed fall per staff. Pt has no complaints at the moment, per [facility] pt has been declining on the ride here. Unsure if he hit his head, [facility] states \"winces when lower back touched.\" Pt is A&Ox1, baseline. Pt is on Eloquis, per brother his POA, he was supposed to stop taking in last week but unsure if he did stop or not.   "

## 2023-08-01 NOTE — ED NOTES
"Pt states he needs to use \"the toilet\" urinal offered and pt unable to void.  Abdomen noted to be distended and tender.  Mcghee placed to drain urine per ERP order.  "

## 2023-08-01 NOTE — ED NOTES
Med rec complete per Ena at [facility].   No allergies list on MAR.   Sulfa and Sulfasalazine allergies left historically.

## 2023-08-02 PROBLEM — Z71.89 ADVANCE CARE PLANNING: Status: ACTIVE | Noted: 2023-01-01

## 2023-08-02 NOTE — CARE PLAN
Problem: Knowledge Deficit - Standard  Goal: Patient and family/care givers will demonstrate understanding of plan of care, disease process/condition, diagnostic tests and medications  Outcome: Progressing     Problem: Respiratory  Goal: Patient will achieve/maintain optimum respiratory ventilation and gas exchange  Outcome: Progressing     Problem: Fall Risk  Goal: Patient will remain free from falls  Outcome: Progressing     The patient is Stable - Low risk of patient condition declining or worsening    Shift Goals  Clinical Goals: Continue antibiotic therapy; monitor SpO2 and mental status  Patient Goals: ADITI  Family Goals: Stay involved in plan of care; stay informed re: care planning    Progress made toward(s) clinical / shift goals:  Family member at bedside this shift given frequent updates re: plan of care. Antibiotic therapy continued; pt maintained SpO2 >/= 90% on room air. Per family member pt appears to be at baseline mental status.    Patient is not progressing towards the following goals: n/a

## 2023-08-02 NOTE — PROGRESS NOTES
Unable to complete admission profile at this time. Pt oriented to self only, confused, requires frequent re-orientation.

## 2023-08-02 NOTE — DOCUMENTATION QUERY
[facility]                                                                       Query Response Note      PATIENT:               QUINTON RIVAS  ACCT #:                  [id number]  MRN:                     [id number]  :                      [id number]  ADMIT DATE:       2023 4:10 PM  DISCH DATE:          RESPONDING  PROVIDER #:        [id number]           QUERY TEXT:    Based on the clinical indicators outlined above, please clarify if you are treating this patient for a known or suspected:    NOTE:  If the appropriate response is not listed below, please respond with a new note.    Thank you.        The patient's Clinical Indicators include:   ED Note: Altered mental status, pneumonia, fever.   H&P: Pneumonia, SIRS, Tem 101.5, dehydration, hypernatremia    Risk factors: Infections, dehydration, hypernatremia    Treatment: Antibiotics, IV fluids    Thank you,  Nathalia Tolentino  Clinical   Connect via [employer]  Options provided:   -- Acute metabolic encephalopathy   -- Other encephalopathy, Please specify   -- Other explanation, Please specify   -- Unable to determine      Query created by: Nathalia Tolentino on 2023 11:31 AM    RESPONSE TEXT:    Unable to determine          Electronically signed by:  SUSANNE ONEILL MD 2023 12:24 PM

## 2023-08-02 NOTE — HOSPITAL COURSE
Abhilash Rios has past medical history that includes dementia, he resides in a memory care unit.  Patient presented the emergency room on 8/1/2023 with unresponsiveness.  Patient was noted to be febrile to 101.5 °F in the emergency room.  Evaluation in the emergency room demonstrated a left sided pneumonia and lactic acidosis.  Patient was fluid resuscitated and treated with antibiotics.

## 2023-08-02 NOTE — PROGRESS NOTES
Bedside report received from night RN. Assumed care of patient. Daily plan of care discussed. Pt resting comfortably in bed with no signs of distress noted. 12 hour chart check complete. Hourly rounding in place.

## 2023-08-02 NOTE — ASSESSMENT & PLAN NOTE
X-ray 8/1/2023, results reviewed, my impression is that there is a left-sided pneumonia  MRSA nasal swab, results reviewed, negative for MRSA  Continue ceftriaxone  Discontinue linezolid  Follow response to treatment

## 2023-08-02 NOTE — CARE PLAN
The patient is Stable - Low risk of patient condition declining or worsening    Shift Goals  Clinical Goals: IVF, abx,safety  Patient Goals: comfort, sleep    Progress made toward(s) clinical / shift goals:  Pt confused and agitated, oriented to self only. IVF infusing. Abx started in the ED. Pt is a high fall risk. Fall bundle in place. Mcghee draining clear yellow urine. Pending labs in am. Repositioned for comfort.  Problem: Knowledge Deficit - Standard  Goal: Patient and family/care givers will demonstrate understanding of plan of care, disease process/condition, diagnostic tests and medications  Outcome: Not Progressing     Problem: Hemodynamics  Goal: Patient's hemodynamics, fluid balance and neurologic status will be stable or improve  Outcome: Progressing     Problem: Fluid Volume  Goal: Fluid volume balance will be maintained  Outcome: Progressing     Problem: Urinary - Renal Perfusion  Goal: Ability to achieve and maintain adequate renal perfusion and functioning will improve  Outcome: Progressing       Patient is not progressing towards the following goals:      Problem: Knowledge Deficit - Standard  Goal: Patient and family/care givers will demonstrate understanding of plan of care, disease process/condition, diagnostic tests and medications  Outcome: Not Progressing

## 2023-08-02 NOTE — ED NOTES
2nd IV established.  Blood cultures x 2 and labs drawn.  Pt rolled.  Rectal temp taken and tylenol given.  Pt in gown & on monitors. IVF started and medicated as ordered.

## 2023-08-02 NOTE — ASSESSMENT & PLAN NOTE
Discussed CODE STATUS and goals of care with the patient's brother who is his POA, patient and his brother present at the bedside, patient unable to participate in the discussion  Patient's brother states that the patient would not want to modify his diet, speech evaluation considered but eventually deferred due to patient's wishes  We discussed options for care in the case of cardiac arrest  Patient's brother states that the patient would want to be DNR/DNI  CODE STATUS updated in epic  Total 18 minutes of advance care discussion

## 2023-08-02 NOTE — H&P
Hospital Medicine History & Physical Note    Date of Service  8/1/2023    Primary Care Physician  James Avila M.D.    Consultants  None     Code Status  Full Code    Chief Complaint  Chief Complaint   Patient presents with    T-5000 FALL     History of Presenting Illness  Abhilash Rios is a 89 y.o. male with a past medical history of dementia who resides in a memory care unit [[facility]] who presented 8/1/2023 with unresponsiveness.  Most of the history was obtained from emergency department physician and patient chart.  Apparently the patient is only oriented to self, and is still taking his Eliquis that should have been stopped since last week.  He was found down with his walker over him, last seen before arrest 2 hours prior.  In the emergency room he was found to be febrile with temperatures running up to 101.5 Fahrenheit.      I discussed the plan of care with emergency department physician and nursing staff    Review of Systems  Review of Systems   Unable to perform ROS: Dementia   Constitutional:  Positive for fever.   Musculoskeletal:  Positive for falls.     Past Medical History   has a past medical history of Atrial flutter (HCC) (3/8/2012), Dizziness (3/8/2012), Macular degeneration (3/8/2012), Pacemaker, Pre-syncope (3/8/2012), Presence of permanent cardiac pacemaker (3/8/2012), SSS (sick sinus syndrome) (HCC) (3/8/2012), TIA (transient ischemic attack) (3/8/2012), and Vertigo (3/8/2012).    Surgical History   has a past surgical history that includes eye surgery; other orthopedic surgery; rotator cuff repair; and trans urethral resection prostate.     Family History  family history includes Non-contributory in an other family member.      Social History   reports that he has never smoked. He has never used smokeless tobacco. He reports that he does not drink alcohol and does not use drugs.    Allergies  Allergies   Allergen Reactions    Sulfa Drugs Anaphylaxis    Sulfasalazine Anaphylaxis      Medications  Prior to Admission Medications   Prescriptions Last Dose Informant Patient Reported? Taking?   Zinc Oxide 22 % Cream unk at unk MAR from Other Facility Yes Yes   Sig: Apply 1 Application topically 1 time a day as needed (to bottom).   acetaminophen (TYLENOL) 325 MG Tab unk at unk MAR from Other Facility Yes Yes   Sig: Take 325 mg by mouth every 6 hours as needed for Mild Pain or Fever. Indications: Fever, Pain   apixaban (ELIQUIS) 2.5mg Tab 8/1/2023 at 0900 MAR from Other Facility No No   Sig: Take 1 Tablet by mouth 2 times a day.   furosemide (LASIX) 20 MG Tab 8/1/2023 at 0900 MAR from Other Facility No No   Sig: Take 1 Tablet by mouth every day.   sodium hypochlorite (DAKINS SOLUTION) 0.5 % Solution 8/1/2023 at 1100 MAR from Other Facility Yes Yes   Sig: Apply 1 Application topically every day.   spironolactone (ALDACTONE) 25 MG Tab 8/1/2023 at 0900 MAR from Other Facility No No   Sig: Take 1 Tablet by mouth every day.      Facility-Administered Medications: None     Physical Exam  Temp:  [36.8 °C (98.2 °F)-38.6 °C (101.5 °F)] 36.8 °C (98.2 °F)  Pulse:  [71-82] 78  Resp:  [16-24] 20  BP: (102-125)/(52-75) 102/54  SpO2:  [92 %-100 %] 97 %  Blood Pressure : 105/65   Temperature: (!) 38.6 °C (101.5 °F)   Pulse: 72   Respiration: 20   Pulse Oximetry: 95 %     Physical Exam  Constitutional:       General: He is not in acute distress.     Appearance: He is not ill-appearing or diaphoretic.      Comments: Lethargic, easily arousable   HENT:      Head: Atraumatic.      Right Ear: External ear normal.      Left Ear: External ear normal.      Nose: No congestion or rhinorrhea.      Mouth/Throat:      Mouth: Mucous membranes are dry.   Eyes:      General: No scleral icterus.        Right eye: No discharge.         Left eye: No discharge.      Pupils: Pupils are equal, round, and reactive to light.   Cardiovascular:      Rate and Rhythm: Normal rate and regular rhythm.   Pulmonary:      Effort: Pulmonary  effort is normal.   Abdominal:      General: There is no distension.   Musculoskeletal:      Cervical back: Neck supple. No rigidity. No muscular tenderness.      Right lower leg: No edema.      Left lower leg: No edema.   Skin:     General: Skin is dry.      Coloration: Skin is not jaundiced or pale.   Neurological:      Mental Status: He is disoriented.      Coordination: Coordination normal.      Comments: Lethargic, easily arousable  Impaired judgment   Psychiatric:      Comments: Lethargic, easily arousable  Impaired judgment       Laboratory:  Recent Labs     08/01/23  1805   WBC 7.5   RBC 3.81*   HEMOGLOBIN 10.7*   HEMATOCRIT 34.3*   MCV 90.0   MCH 28.1   MCHC 31.2*   RDW 52.6*   PLATELETCT 197   MPV 9.2     Recent Labs     08/01/23  1635   SODIUM 134*   POTASSIUM 5.1   CHLORIDE 97   CO2 24   GLUCOSE 101*   BUN 30*   CREATININE 1.62*   CALCIUM 8.8     Recent Labs     08/01/23  1635   ALTSGPT 8   ASTSGOT 18   ALKPHOSPHAT 61   TBILIRUBIN 0.6   GLUCOSE 101*         No results for input(s): NTPROBNP in the last 72 hours.      No results for input(s): TROPONINT in the last 72 hours.    Imaging:  CT-CSPINE WITHOUT PLUS RECONS   Final Result         1. No acute fracture from C1 through T1 is visualized. Diffuse demineralization limits sensitivity         DX-CHEST-PORTABLE (1 VIEW)   Final Result         1. Left midlung opacity. Presumed pneumonia. Follow-up is advised.      CT-LSPINE W/O PLUS RECONS   Final Result      1.  No acute fracture or traumatic listhesis in the lumbar spine.   2.  Severe lumbar spondylosis.      CT-HEAD W/O   Final Result      1. No CT evidence of acute infarct, hemorrhage or mass.   2. Moderate to severe global parenchymal atrophy. Chronic small vessel ischemic changes.        Assessment/Plan:  Justification for Admission Status  I anticipate this patient will require at least two midnights for appropriate medical management, necessitating inpatient admission because the patient has  pneumonia    [facility]  The patient has pneumonia    * Lobar pneumonia (HCC)- (present on admission)  Assessment & Plan  X-rays show evidence of left middle lung opacity concerning for pneumonia  I will start ceftriaxone and linezolid, follow on cultures and sensitivities    Lactic acidosis- (present on admission)  Assessment & Plan  Likely secondary to infection and dehydration.  Anticipate improvement with intravenous fluids and antibiotics    SIRS (systemic inflammatory response syndrome) (HCC)- (present on admission)  Assessment & Plan  SIRS criteria identified on my evaluation include:  Fever, with temperature greater than 100.9 deg F and Tachypnea, with respirations greater than 20 per minute  SIRS is due to pneumonia  Treatmenting with antibiotics    Hyponatremia- (present on admission)  Assessment & Plan  Hypovolemic hyponatremia  I expect Na to improve with IVF     Complete heart block (HCC)- (present on admission)  Assessment & Plan  S/p pacemaker placement    S/P ablation of atrial flutter- (present on admission)  Assessment & Plan  Used to be on Eliquis however this has been discontinued recently  Not currently on blocking agents    VTE prophylaxis: SCDs/TEDs and heparin ppx

## 2023-08-02 NOTE — PROGRESS NOTES
4 Eyes Skin Assessment Completed by SARA Reich and SARA Eng.    Head WDL  Ears Redness and Blanching/R ear  Nose WDL  Mouth WDL  Neck WDL  Breast/Chest WDL  Shoulder Blades WDL  Spine WDL  (R) Arm/Elbow/Hand WDL  (L) Arm/Elbow/Hand WDL  Abdomen WDL  Groin WDL  Scrotum/Coccyx/Buttocks Redness/Stage 2 L buttock/ R buttock redness  (R) Leg Redness, Scab, Swelling, and Abrasion/flaky  (L) Leg Redness, Scab, Abrasion, and Edema/flaky  (R) Heel/Foot/Toe Blanching/flaky  (L) Heel/Foot/Toe Blanching/flaky          Devices In Places Tele Box, Blood Pressure Cuff, Pulse Ox, and Mcghee      Interventions In Place Pillows, Q2 Turns, Barrier Cream, and Heels Loaded W/Pillows    Possible Skin Injury Yes    Pictures Uploaded Into Epic Yes  Wound Consult Placed Yes  RN Wound Prevention Protocol Ordered No

## 2023-08-02 NOTE — PROGRESS NOTES
[facility] Medicine Daily Progress Note    Date of Service  8/2/2023    Chief Complaint  Abhilash Rios is a 89 y.o. male admitted 8/1/2023 with falls    Hospital Course  Abhilash Rios has past medical history that includes dementia, he resides in a memory care unit.  Patient presented the emergency room on 8/1/2023 with unresponsiveness.  Patient was noted to be febrile to 101.5 °F in the emergency room.  Evaluation in the emergency room demonstrated a left sided pneumonia and lactic acidosis.  Patient was fluid resuscitated and treated with antibiotics.    Interval Problem Update  Patient seen and examined, discussed with bedside RN, discussed with the patient's brother who is at the bedside  Patient is sleeping, he can be awakened and is pleasantly confused and cooperative  Noted to be coughing quite a bit, cough is wet, he has been afebrile  Patient himself is unable to provide additional interval history due to dementia  His brother states that he has been falling more often than usual in the last week    I have discussed this patient's plan of care and discharge plan at IDT rounds today with Case Management, Nursing, Nursing leadership, and other members of the IDT team.    Consultants/Specialty  None    Code Status  DNAR/DNI    Disposition  The patient is not medically cleared for discharge to home or a post-acute facility.  Anticipate discharge to: skilled nursing facility    I have placed the appropriate orders for post-discharge needs.    Review of Systems  Review of Systems   Unable to perform ROS: Dementia        Physical Exam  Temp:  [36.5 °C (97.7 °F)-38.6 °C (101.5 °F)] 36.5 °C (97.7 °F)  Pulse:  [69-82] 70  Resp:  [16-24] 16  BP: ()/(49-75) 96/60  SpO2:  [92 %-100 %] 93 %    Physical Exam  Constitutional:       General: He is not in acute distress.     Appearance: Normal appearance. He is normal weight.   HENT:      Right Ear: External ear normal.      Left Ear: External ear normal.      Nose:  Nose normal.      Mouth/Throat:      Mouth: Mucous membranes are moist.      Pharynx: Oropharynx is clear.   Eyes:      Extraocular Movements: Extraocular movements intact.      Conjunctiva/sclera: Conjunctivae normal.      Pupils: Pupils are equal, round, and reactive to light.   Cardiovascular:      Rate and Rhythm: Normal rate and regular rhythm.      Pulses: Normal pulses.   Pulmonary:      Effort: Pulmonary effort is normal.      Breath sounds: Normal breath sounds.      Comments: Bibasilar crackles, left greater than right  Abdominal:      General: Abdomen is flat. Bowel sounds are normal. There is no distension.      Palpations: Abdomen is soft.   Musculoskeletal:         General: No swelling. Normal range of motion.      Cervical back: Normal range of motion and neck supple.   Skin:     General: Skin is warm and dry.      Capillary Refill: Capillary refill takes less than 2 seconds.      Coloration: Skin is not jaundiced.   Neurological:      General: No focal deficit present.      Mental Status: He is alert.      Cranial Nerves: No cranial nerve deficit.      Gait: Gait normal.      Comments: Very pleasant, oriented only to self         Fluids    Intake/Output Summary (Last 24 hours) at 8/2/2023 1228  Last data filed at 8/2/2023 0800  Gross per 24 hour   Intake 0 ml   Output 1000 ml   Net -1000 ml       Laboratory  Recent Labs     08/01/23  1805 08/01/23  2037 08/02/23  0110   WBC 7.5 7.7 7.2   RBC 3.81* 3.86* 3.71*   HEMOGLOBIN 10.7* 11.0* 10.3*   HEMATOCRIT 34.3* 35.4* 33.4*   MCV 90.0 91.7 90.0   MCH 28.1 28.5 27.8   MCHC 31.2* 31.1* 30.8*   RDW 52.6* 53.8* 53.1*   PLATELETCT 197 178 177   MPV 9.2 9.7 9.0     Recent Labs     08/01/23  1635 08/02/23  0110   SODIUM 134* 135   POTASSIUM 5.1 4.4   CHLORIDE 97 103   CO2 24 21   GLUCOSE 101* 106*   BUN 30* 28*   CREATININE 1.62* 1.54*   CALCIUM 8.8 8.1*                   Imaging  CT-CSPINE WITHOUT PLUS RECONS   Final Result         1. No acute fracture from C1  through T1 is visualized. Diffuse demineralization limits sensitivity         DX-CHEST-PORTABLE (1 VIEW)   Final Result         1. Left midlung opacity. Presumed pneumonia. Follow-up is advised.      CT-LSPINE W/O PLUS RECONS   Final Result      1.  No acute fracture or traumatic listhesis in the lumbar spine.   2.  Severe lumbar spondylosis.      CT-HEAD W/O   Final Result      1. No CT evidence of acute infarct, hemorrhage or mass.   2. Moderate to severe global parenchymal atrophy. Chronic small vessel ischemic changes.           Assessment/Plan  * Lobar pneumonia (HCC)- (present on admission)  Assessment & Plan  X-ray 8/1/2023, results reviewed, my impression is that there is a left-sided pneumonia  MRSA nasal swab, results reviewed, negative for MRSA  Continue ceftriaxone  Discontinue linezolid  Follow response to treatment    Hyponatremia- (present on admission)  Assessment & Plan  Resolved    S/P ablation of atrial flutter- (present on admission)  Assessment & Plan  Rate controlled  Eliquis recently discontinued due to frequent falls    Advance care planning- (present on admission)  Assessment & Plan  Discussed CODE STATUS and goals of care with the patient's brother who is his POA, patient and his brother present at the bedside, patient unable to participate in the discussion  Patient's brother states that the patient would not want to modify his diet, speech evaluation considered but eventually deferred due to patient's wishes  We discussed options for care in the case of cardiac arrest  Patient's brother states that the patient would want to be DNR/DNI  CODE STATUS updated in epic  Total 18 minutes of advance care discussion    Lactic acidosis- (present on admission)  Assessment & Plan  Resolved    SIRS (systemic inflammatory response syndrome) (HCC)- (present on admission)  Assessment & Plan  Resolved    Complete heart block (HCC)- (present on admission)  Assessment & Plan  S/p pacemaker placement          VTE prophylaxis:    heparin ppx      I have performed a physical exam and reviewed and updated ROS and Plan today (8/2/2023). In review of yesterday's note (8/1/2023), there are no changes except as documented above.

## 2023-08-02 NOTE — ASSESSMENT & PLAN NOTE
Ms. Malagon is a 72 yo woman transferred from [place] for cervical spine injury and seizure activity. She has history of HTN, DM2, seizure disorder and had an unwitnessed seizure (presumed) at nursing home followed by fall. On arrival by EMS she was responding minimally. Did not require CPR. She had one seizure en route to hospital at [place] and received versed. At ER at OSH, patient had another witnessed seizure with tonic clonic activity in extremities and became unresponsive. She was intubated, and given iv fosphenytoin. CTH was negative. CT c-spine showed odontoid fracture and C1 fracture. She received versed and fosphenytoin and OSH, when she was transferred by helicopter to [facility], en route she was started on ketamine gtt at 0.5 mcg/kg/min. She was transferred to [facility] for cervical fracture requiring neurosurg intervention and also seizure monitoring and control.    While in house, she was evaluated by NSGY who recommended deferral of surgery pending outpatient follow up in 2 weeks (stable atlas fracture, but unstable odontoid). She must remain in C-collar during this time. She was started on keppra 750mg bid for seizures without any further episodes since admission. Does have some agitation at night, on seroquel 25/50. PT/OT/SLP consulted, anticipate SNF placement.    [facility] will assume care once patient reaches a floor bed.       Resolved

## 2023-08-03 PROBLEM — E87.20 LACTIC ACIDOSIS: Status: RESOLVED | Noted: 2023-01-01 | Resolved: 2023-01-01

## 2023-08-03 PROBLEM — R65.10 SIRS (SYSTEMIC INFLAMMATORY RESPONSE SYNDROME) (HCC): Status: RESOLVED | Noted: 2023-01-01 | Resolved: 2023-01-01

## 2023-08-03 PROBLEM — E87.1 HYPONATREMIA: Status: RESOLVED | Noted: 2023-01-01 | Resolved: 2023-01-01

## 2023-08-03 NOTE — CARE PLAN
The patient is Watcher - Medium risk of patient condition declining or worsening    Shift Goals  Clinical Goals: Pt safety. Antibiotics.  Patient Goals: Sleep  Family Goals: No family present    Progress made toward(s) clinical / shift goals:  Have been checking on Pt hourly. He has been encouraged to call for assistance as needed. Bed in low position, upper side rails up, anti slippery socks on, call light within reach, and bed and strip alarm on.        Patient is not progressing towards the following goals:      Problem: Knowledge Deficit - Standard  Goal: Patient and family/care givers will demonstrate understanding of plan of care, disease process/condition, diagnostic tests and medications  Outcome: Not Progressing  Note: Pt with Hx of dementia. He is confused and oriented to self only.      Problem: Pain - Standard  Goal: Alleviation of pain or a reduction in pain to the patient’s comfort goal  Outcome: Progressing     Problem: Skin Integrity  Goal: Skin integrity is maintained or improved  Outcome: Progressing

## 2023-08-03 NOTE — PROGRESS NOTES
Patient discharged to [facility] with [facility] and staff escort. IV discontinued. Prescriptions given to transporters, verbalized understanding, consent signed and in chart. Discharge instructions given regarding plan of care, follow up appointments, addition of oral antibiotics, and when to return for worsening symptoms.  Education provided, patient's comprehension hard to evaluate due to dementia. Discharge paperwork in chart and a copy sent with [facility]. Patient is tolerating diet, requires 2-3X assistance to ambulate, and pain is well controlled. All belongings collected.    1540- I left message on [facility] medical director's voicemail, informing them of patient's transfer and ETA.

## 2023-08-03 NOTE — DISCHARGE PLANNING
DC Transport Scheduled    Received request at: 1108 8/3/2023    Transport Company Scheduled:  [facility]    Scheduled Date: 8/3/2023  Scheduled Time: 1530    Destination: [facility]    Notified care team of scheduled transport via Voalte.     If there are any changes needed to the DC transportation scheduled, please contact [facility] at ext. [phone] between the hours of 6843-7243 Mon-Fri. If outside those hours, contact the ED Case Manager at ext. [phone].

## 2023-08-03 NOTE — FACE TO FACE
Face to Face Supporting Documentation - Home Health    The encounter with this patient was in whole or in part the primary reason for home health admission.    Date of encounter:   Patient:                    MRN:                       YOB: 2023  Abhilash Rios  [id number]  7/24/1929     Home health to see patient for:  Skilled Nursing care for assessment, interventions & education, Wound Care, Physical Therapy evaluation and treatment, and Occupational therapy evaluation and treatment    Skilled need for:  New Onset Medical Diagnosis pneumonia, lower extremity wounds    Skilled nursing interventions to include:  Wound Care and Comment: medication management    Homebound status evidenced by:  Needs the assistance of another person in order to leave the home. Leaving home requires a considerable and taxing effort. There is a normal inability to leave the home.    Community Physician to provide follow up care: James Avila M.D.     Optional Interventions? No      I certify the face to face encounter for this home health care referral meets the CMS requirements and the encounter/clinical assessment with the patient was, in whole, or in part, for the medical condition(s) listed above, which is the primary reason for home health care. Based on my clinical findings: the service(s) are medically necessary, support the need for home health care, and the homebound criteria are met.  I certify that this patient has had a face to face encounter by myself.  Kael Zamarripa M.D. - NPI: [id number]

## 2023-08-03 NOTE — DISCHARGE PLANNING
Case Management Discharge Planning    Admission Date: 8/1/2023  GMLOS: 2.9  ALOS: 2    6-Clicks ADL Score: 11  6-Clicks Mobility Score: 11  PT and/or OT Eval ordered: No  Post-acute Referrals Ordered: NA  Post-acute Choice Obtained: NA  Has referral(s) been sent to post-acute provider:  [facility]      Anticipated Discharge Dispo: Discharge Disposition: Discharged to home/self care (01)    DME Needed: No    Action(s) Taken: Spoke to  at [facility]. Per , she will contact director Lynn and ask Leah to call this RNCM to coordinate pt's DC back to [facility].      Spoke to Leah from [facility]. Leah requested scripts for any new medications and requested DC Summary be faxed to [phone] prior to pt discharging.    Transport request faxed to [facility], pending confirmation.     DC Summary faxed to Leah as requested, Leah confirmed DC Summary received.     Transport confirmed for today 08/03/2023 at 1530 via T WC. Pt's brother Fuentes updated via phone call.    Choice form completed for Lynda  per resumption of care and faxed to [facility].    Escalations Completed: None    Medically Clear: Yes    Next Steps: f/u with [facility] director Lynn regarding DC time/transport    Barriers to Discharge: DC coordination with [facility] facility    Care Transition Team Assessment    Information Source  Orientation Level: Oriented to person, Disoriented to place, Disoriented to time, Disoriented to situation  Information Given By: Other (Comments) (chart)  Informant's Name: Chart  Who is responsible for making decisions for patient? : POA    Readmission Evaluation  Is this a readmission?: No    Elopement Risk  Legal Hold: No  Ambulatory or Self Mobile in Wheelchair: No-Not an Elopement Risk  Disoriented: Place-At Risk for Elopement, Time-At Risk for Elopement, Situation-At Risk for Elopement  Psychiatric Symptoms: None  History of Wandering: No  Elopement this Admit: No  Vocalizing  Wanting to Leave: No  Displays Behaviors, Body Language Wanting to Leave: No-Not at Risk for Elopement  Elopement Risk: Not at Risk for Elopement  Wanderguard On: Unavailable  Personal Belongings: Hospital Clothing Only  Picture of Patient on Inside Chart Front Cover: No (See Comments)  Purple Armband on Patient: No (See Comments)         Discharge Preparedness  What is your plan after discharge?: Home health care  What are your discharge supports?: Sibling         Finances  Financial Barriers to Discharge: No  Prescription Coverage: Yes    Vision / Hearing Impairment  Right Eye Vision: Impaired, Wears Glasses  Left Eye Vision: Impaired, Wears Glasses         Advance Directive  Advance Directive?: DPOA for Health Care  Durable Power of  Name and Contact : Fuentes Blanca ([phone])         Psychological Assessment  History of Substance Abuse: None  History of Psychiatric Problems: No  Non-compliant with Treatment: No  Newly Diagnosed Illness: Yes    Discharge Risks or Barriers  Discharge risks or barriers?: No  Patient risk factors: Cognitive / sensory / physical deficit, Vulnerable adult    Anticipated Discharge Information  Discharge Disposition: D/T to home under [facility] care in anticipation of covered skilled care (06)

## 2023-08-03 NOTE — DISCHARGE SUMMARY
Discharge Summary    CHIEF COMPLAINT ON ADMISSION  Chief Complaint   Patient presents with    T-5000 FALL       Reason for Admission  Fall    Admission Date  8/1/2023    CODE STATUS  DNAR/DNI    HPI & HOSPITAL COURSE  This is a 89 y.o. male here with fall     Abhilash Rios has past medical history that includes dementia, he resides in a memory care unit.  Patient presented the emergency room on 8/1/2023 with unresponsiveness.  Patient was noted to be febrile to 101.5 °F in the emergency room.  Evaluation in the emergency room demonstrated a left sided pneumonia and lactic acidosis.  Patient was fluid resuscitated and treated with antibiotics.    The patient has improved to his baseline, he is pleasant and oriented only to himself.  He has been afebrile and WBC is normal.  He can be discharged home to complete antibiotics and I have prescribed Augmentin.     With regards to the patients diuretics, he is appropriately on lasix and spironolactone for chronic lower extremity edema complicated by chronic venous stasis wounds.  His labs on admission were concerning for dehydration.  At this time, I am recommending that he take his diuretics every other day to try to maintain an appropriate fluid balance.  A follow up with Dr. Garrett will be scheduled for further adjustment of diuretic dose.    Continue wound care for his chronic lower extremity skin breakdown.    Therefore, he is discharged in fair and stable condition to memory care    The patient met 2-midnight criteria for an inpatient stay at the time of discharge.    Discharge Date  8/3/2023    FOLLOW UP ITEMS POST DISCHARGE  Follow up with PCP as discussed above    DISCHARGE DIAGNOSES  Principal Problem:    Lobar pneumonia (HCC) (POA: Yes)  Active Problems:    S/P ablation of atrial flutter (POA: Yes)      Overview: [facility]    Complete heart block (HCC) (POA: Yes)    Advance care planning (POA: Yes)  Resolved Problems:    Hyponatremia (POA: Yes)    SIRS  (systemic inflammatory response syndrome) (HCC) (POA: Yes)    Lactic acidosis (POA: Yes)      FOLLOW UP  Future Appointments   Date Time Provider Department Center   10/11/2023  8:00 AM Durga Godinez M.D. [facility] None     James Avila M.D.  [address]  [place] [address]  [phone]    Follow up  Please call your primary care provider to schedule a hospital follow up. Thank you.      MEDICATIONS ON DISCHARGE     Medication List        START taking these medications        Instructions   amoxicillin-clavulanate 875-125 MG Tabs  Commonly known as: Augmentin   Take 1 Tablet by mouth 2 times a day.  Dose: 1 Tablet            CHANGE how you take these medications        Instructions   furosemide 20 MG Tabs  What changed: when to take this  Commonly known as: Lasix   Take 1 Tablet by mouth every 48 hours.  Dose: 20 mg     spironolactone 25 MG Tabs  What changed: when to take this  Commonly known as: Aldactone   Take 1 Tablet by mouth every 48 hours.  Dose: 25 mg            CONTINUE taking these medications        Instructions   acetaminophen 325 MG Tabs  Commonly known as: Tylenol   Take 325 mg by mouth every 6 hours as needed for Mild Pain or Fever. Indications: Fever, Pain  Dose: 325 mg     sodium hypochlorite 0.5 % Soln  Commonly known as: Dakins Solution   Apply 1 Application topically every day.  Dose: 1 Application     Zinc Oxide 22 % Crea   Apply 1 Application topically 1 time a day as needed (to bottom).  Dose: 1 Application            STOP taking these medications      apixaban 2.5mg Tabs  Commonly known as: Eliquis              Allergies  Allergies   Allergen Reactions    Sulfa Drugs Anaphylaxis    Sulfasalazine Anaphylaxis       DIET  Orders Placed This Encounter   Procedures    Diet Order Diet: Regular     Standing Status:   Standing     Number of Occurrences:   1     Order Specific Question:   Diet:     Answer:   Regular [1]       ACTIVITY  As tolerated.  Weight bearing as  tolerated    CONSULTATIONS  None    PROCEDURES    CT scan of the head 8/1/2023:  1. No CT evidence of acute infarct, hemorrhage or mass.  2. Moderate to severe global parenchymal atrophy. Chronic small vessel ischemic changes.    CT scan of lumbar spine 8/1/2023:  1.  No acute fracture or traumatic listhesis in the lumbar spine.  2.  Severe lumbar spondylosis.    CT scan of cervical spine 8/1/2023:  1. No acute fracture from C1 through T1 is visualized. Diffuse demineralization limits sensitivity       LABORATORY  Lab Results   Component Value Date    SODIUM 135 08/02/2023    POTASSIUM 4.4 08/02/2023    CHLORIDE 103 08/02/2023    CO2 21 08/02/2023    GLUCOSE 106 (H) 08/02/2023    BUN 28 (H) 08/02/2023    CREATININE 1.54 (H) 08/02/2023        Lab Results   Component Value Date    WBC 7.2 08/02/2023    HEMOGLOBIN 10.3 (L) 08/02/2023    HEMATOCRIT 33.4 (L) 08/02/2023    PLATELETCT 177 08/02/2023        Total time of the discharge process exceeds 43 minutes.

## 2023-08-03 NOTE — DISCHARGE PLANNING
Received Choice form at: [id number]  Agency/Facility name: [facility]  Referral sent per Choice form at: [phone]

## 2023-08-03 NOTE — PROGRESS NOTES
Bedside report received from Kirill RUIZ and assumed Pt's cares. Call light within reach. Safety measures in place.

## 2023-08-03 NOTE — WOUND TEAM
[facility]  Inpatient Services  Initial Wound and Skin Care Evaluation    Admission Date: 8/1/2023     Last order of IP CONSULT TO WOUND CARE was found on 8/2/2023 from Hospital Encounter on 8/1/2023     HPI, PMH, SH: Reviewed    Past Surgical History:   Procedure Laterality Date    EYE SURGERY      09/02/09 Status post eye surgery for macular degeneration.    OTHER ORTHOPEDIC SURGERY      Lower Back Surgery    ROTATOR CUFF REPAIR      09/02/09 Bilateral.    TRANS URETHRAL RESECTION PROSTATE       Social History     Tobacco Use    Smoking status: Never    Smokeless tobacco: Never   Substance Use Topics    Alcohol use: No     Chief Complaint   Patient presents with    T-5000 FALL     Diagnosis: Lobar pneumonia (HCC) [J18.1]    Unit where seen by Wound Team: 3308/00     WOUND CONSULT RELATED TO:  BLE and buttock    WOUND HISTORY:   He was found down with his walker over him, last seen before arrest 2 hours prior.  In the emergency room he was found to be febrile with temperatures running up to 101.5 Fahrenheit.          WOUND ASSESSMENT/LDA  Wound 08/01/23 Partial Thickness Wound Pretibial Anterior Right (Active)   Date First Assessed/Time First Assessed: 08/01/23 2000   Present on Original Admission: Yes  Primary Wound Type: Partial Thickness Wound  Location: Pretibial  Wound Orientation: Anterior  Laterality: Right        Assessments 8/2/2023  6:00 PM   Site Assessment Dry;Scabbed   Periwound Assessment Dry;Crusted   Margins Defined edges   Closure Secondary intention   Drainage Amount None   Treatments Cleansed   Wound Cleansing Normal Saline Irrigation   Periwound Protectant Skin Moisturizer   Dressing Status Intact   Dressing Changed Changed   Dressing Options Petrolatum Gauze (yellow);Absorbent Abdominal Pad;Dry Roll Gauze   Dressing Change/Treatment Frequency Daily, and As Needed   NEXT Dressing Change/Treatment Date 08/03/23   NEXT Weekly Photo (Inpatient Only) 08/08/23   Wound Team Following  Not following   Non-staged Wound Description Partial thickness       Wound 08/01/23 Partial Thickness Wound Pretibial Anterior Left (Active)   Date First Assessed/Time First Assessed: 08/01/23 2000   Present on Original Admission: Yes  Hand Hygiene Completed: Yes  Primary Wound Type: Partial Thickness Wound  Location: Pretibial  Wound Orientation: Anterior  Laterality: Left      Assessments 8/2/2023  6:00 PM        Site Assessment Dry;Scabbed   Periwound Assessment Dry;Crusted   Margins Attached edges   Closure Open to air   Drainage Amount None   Treatments Cleansed   Wound Cleansing Foam Cleanser/Washcloth   Periwound Protectant Skin Moisturizer   Dressing Status Open to Air   NEXT Weekly Photo (Inpatient Only) 08/08/23   Wound Team Following Not following   Non-staged Wound Description Partial thickness       Wound 08/01/23 Full Thickness Wound Buttocks;Cheek Medial Left (Active)   Date First Assessed/Time First Assessed: 08/01/23 2000   Present on Original Admission: Yes  Hand Hygiene Completed: Yes  Primary Wound Type: Full Thickness Wound  Location: Buttocks;Cheek  Wound Orientation: Medial  Laterality: Left      Assessments 8/2/2023  6:00 PM        Site Assessment Pink   Periwound Assessment Pink;Red   Margins Defined edges   Closure Open to air   Drainage Amount None   Treatments Cleansed;Site care   Wound Cleansing Foam Cleanser/Washcloth   Periwound Protectant Barrier Paste   Dressing Status Open to Air   NEXT Weekly Photo (Inpatient Only) 08/08/23   Wound Team Following Not following   Non-staged Wound Description Full thickness   Wound Length (cm) 2 cm   Wound Width (cm) 1.5 cm   Wound Depth (cm) 0.3 cm   Wound Surface Area (cm^2) 3 cm^2   Wound Volume (cm^3) 0.9 cm^3   Shape irregular x 2 parallel        Vascular:    MAHI:   No results found.    Lab Values:    Lab Results   Component Value Date/Time    WBC 7.2 08/02/2023 01:10 AM    RBC 3.71 (L) 08/02/2023 01:10 AM    HEMOGLOBIN 10.3 (L) 08/02/2023 01:10  AM    HEMATOCRIT 33.4 (L) 08/02/2023 01:10 AM         Culture Results show:  No results found for this or any previous visit (from the past 720 hour(s)).    Pain Level/Medicated:  None, Tolerated without pain medication       INTERVENTIONS BY WOUND TEAM:  Chart and images reviewed. Discussed with bedside RN. All areas of concern (based on picture review, LDA review and discussion with bedside RN) have been thoroughly assessed. Documentation of areas based on significant findings. This RN in to assess patient. Performed standard wound care which includes appropriate positioning, dressing removal and non-selective debridement. Pictures and measurements obtained weekly if/when required.    Wound:  RLE  Preparation for Dressing removal: Removed without difficulty  Cleansed/Non-selectively Debrided with:  No rinse foam soap and Moist warm washcloth  Non-Excisional Conservative Sharp debridement: slightly adherent skin and crust debrided away using scissors and forceps < 20cm2 debrided down to intact skin.  No Bleeding noted.  Sierra wound: Cleansed with No rinse foam soap and Moist warm washcloth, Prepped with moisturizer  Primary Dressing:  xeroform  Secondary (Outer) Dressing: abd pad and dry roll gauze     Wound:  L dorsal foot  Cleansed/Non-selectively Debrided with:  No rinse foam soap and Moist warm washcloth  S[place] wound: Cleansed with No rinse foam soap and Moist warm washcloth, Prepped with moisturizer  Primary Dressing:  xeroform  Secondary (Outer) Dressing: dry gauze and roll gauze     Wound:  gluteal cleft/L buttock  Cleansed/Non-selectively Debrided with:  No rinse foam soap and Moist warm washcloth  Sierra wound: Cleansed with No rinse foam soap and Moist warm washcloth, Prepped with barrier paste  Primary Dressing:  RAJWINDER      Advanced Wound Care Discharge Planning  Number of Clinicians necessary to complete wound care: 1  Is patient requiring IV pain medications for dressing changes:  No   Length of time for  dressing change 40 min. (This does not include chart review, pre-medication time, set up, clean up or time spent charting.)    Interdisciplinary consultation: Patient, Bedside RN   EVALUATION / RATIONALE FOR TREATMENT:     Date:  08/02/23  Wound Status:  Initial evaluation    There are multiple small scabs to LLE and a couple of areas to RLE where tissue almost resolved. Red fluid filled blister RLE anterior and Clear fluid filled blister dorsal L foot.  BLE with dry skin that is slightly adherent and when scraped or lifted with forceps intact skin is revealed. Xeroform in use to provide scant moisture and as non-adherent to protect blisters until either reabsorbed opened.  L side of gluteal cleft with 2 parallel tears with some depth. No drainage. Barrier paste in use to protect and provide slight moisture.          Goals: Steady decrease in wound area and depth weekly.    WOUND TEAM PLAN OF CARE - Frequency of Follow-up:   Nursing to follow dressing orders written for wound care. Contact wound team if area fails to progress, deteriorates or with any questions/concerns if something comes up before next scheduled follow up (See below as to whether wound is following and frequency of wound follow up)   Not following, consult as needed      NURSING PLAN OF CARE ORDERS:  Dressing changes: See Dressing Care orders    NUTRITION RECOMMENDATIONS   Wound Team Recommendations:  N/A    DIET ORDERS (From admission to next 24h)       Start     Ordered    08/01/23 1901  Diet Order Diet: Regular  ALL MEALS        Question:  Diet:  Answer:  Regular    08/01/23 1901                    PREVENTATIVE INTERVENTIONS:    Q shift Moris - performed per nursing policy  Q shift pressure point assessments - performed per nursing policy    Surface/Positioning  Standard/trauma mattress - Currently in Place  Reposition q 2 hours - Currently in Place    Containment/Moisture Prevention    Mcghee Catheter - Currently in Place  Bathroom for stool -  Currently in Place    Mobilization      Ambulate  with FWW    Anticipated discharge plans:  Skilled Nursing        Vac Discharge Needs:  Vac Discharge plan is purely a recommendation from wound team and not a requirement for discharge unless otherwise stated by physician.  Not Applicable Pt not on a wound vac

## 2023-09-05 NOTE — CARDIAC REMOTE MONITOR - SCAN
Device transmission reviewed. Device demonstrated appropriate function.       Electronically Signed by: Josh Duval M.D.    9/11/2023  8:56 PM

## 2023-09-10 NOTE — ED NOTES
(Assist RN) Attempted to call report to [facility] after previous attempt from [facility]. Was able to reach staff in another building who is not vero with pt - given direct pod extension to call [facility] back for report/arrange transport. Pt remains in bed, call bell in reach, bed alarm in place. On monitor.

## 2023-09-10 NOTE — ED PROVIDER NOTES
"ED Provider Note    CHIEF COMPLAINT  Chief Complaint   Patient presents with    Fall    T-5000 Head Injury       EXTERNAL RECORDS REVIEWED  Reviewed recent admission and discharge summary from August 1 as well as cardiology interrogation of pacemaker    HPI/ROS  LIMITATION TO HISTORY   Patient has dementia  OUTSIDE HISTORIAN(S):  EMS provided additional history    Abhilash Rios is a 89 y.o. male who presents by ambulance from a local [facility] facility for apparent head trauma.  The patient was apparently found on the ground.  He had an abrasion and small laceration to the central aspect of the forehead.  Patient was confused.  He has a history of advanced dementia.  He had previously been on blood thinners but apparently was recently admitted to the hospital after a similar fall at a reassuring work-up and subsequent blood thinners were discontinued.  He has a known history of pacemaker.  No associated pain or deformity to the upper or lower extremities.  He denies chest or abdominal pain.  He could not provide any meaningful history as to the nature of the fall but he reports that he think that he \"slipped.  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    SURGICAL HISTORY   has a past surgical history that includes eye surgery; other orthopedic surgery; rotator cuff repair; and trans urethral resection prostate.    FAMILY HISTORY  Family History   Problem Relation Age of Onset    Non-contributory Other        SOCIAL HISTORY  Social History     Tobacco Use    Smoking status: Never    Smokeless tobacco: Never   Vaping Use    Vaping Use: Never used   Substance and Sexual Activity    Alcohol use: No    Drug use: No    " "Sexual activity: Not on file       CURRENT MEDICATIONS  Home Medications    **Home medications have not yet been reviewed for this encounter**     No current facility-administered medications for this encounter.    Current Outpatient Medications:     amoxicillin-clavulanate (AUGMENTIN) 875-125 MG Tab, Take 1 Tablet by mouth 2 times a day., Disp: 8 Tablet, Rfl: 0    furosemide (LASIX) 20 MG Tab, Take 1 Tablet by mouth every 48 hours., Disp: 30 Tablet, Rfl: 0    spironolactone (ALDACTONE) 25 MG Tab, Take 1 Tablet by mouth every 48 hours., Disp: 30 Tablet, Rfl: 0    sodium hypochlorite (DAKINS SOLUTION) 0.5 % Solution, Apply 1 Application topically every day., Disp: , Rfl:     Zinc Oxide 22 % Cream, Apply 1 Application topically 1 time a day as needed (to bottom)., Disp: , Rfl:     acetaminophen (TYLENOL) 325 MG Tab, Take 325 mg by mouth every 6 hours as needed for Mild Pain or Fever. Indications: Fever, Pain, Disp: , Rfl:       ALLERGIES  Allergies   Allergen Reactions    Sulfa Drugs Anaphylaxis    Sulfasalazine Anaphylaxis       PHYSICAL EXAM  VITAL SIGNS: /67   Pulse 71   Temp 36.9 °C (98.4 °F) (Temporal)   Resp 15   Ht 1.676 m (5' 6\")   Wt 65.8 kg (145 lb)   SpO2 95%   BMI 23.40 kg/m²    Pulse ox interpretation: I interpret this pulse ox as normal.  Constitutional oriented to person only no acute distress   HEENT: No hemotympanum negative venegas sign superficial nonsuturable V-shaped 1 cm laceration to the forehead pupils are equal round reactive to light extraocular movements are intact. The nares is clear, external ears are normal, mouth shows moist mucous membranes normal dentition for age  Neck: Supple, no JVD no tracheal deviation no midline bony tenderness  Cardiovascular: Regular rate and rhythm no murmur rub or gallop 2+ pulses peripherally x4  Thorax & Lungs: No respiratory distress, no wheezes rales or rhonchi, No chest tenderness.   GI: Soft nontender nondistended positive bowel sounds, no " peritoneal signs  Skin: Warm dry no acute rash or lesion  Musculoskeletal: Moving all extremities with full range and 5 of 5 strength no acute  deformity  Neurologic: Cranial nerves III through XII are grossly intact no sensory deficit no cerebellar dysfunction facial droop no pronator drift of arms or legs finger-nose testing is normal gait was not assessed.  Disoriented to time and place but follows commands.  Psychiatric: Appropriate affect for situation at this time          DIAGNOSTIC STUDIES / PROCEDURES      LABS  Results for orders placed or performed during the hospital encounter of 09/10/23   CBC WITH DIFFERENTIAL   Result Value Ref Range    WBC 5.1 4.8 - 10.8 K/uL    RBC 3.91 (L) 4.70 - 6.10 M/uL    Hemoglobin 10.9 (L) 14.0 - 18.0 g/dL    Hematocrit 33.9 (L) 42.0 - 52.0 %    MCV 86.7 81.4 - 97.8 fL    MCH 27.9 27.0 - 33.0 pg    MCHC 32.2 (L) 32.3 - 36.5 g/dL    RDW 52.0 (H) 35.9 - 50.0 fL    Platelet Count 220 164 - 446 K/uL    MPV 9.3 9.0 - 12.9 fL    Neutrophils-Polys 46.70 44.00 - 72.00 %    Lymphocytes 28.70 22.00 - 41.00 %    Monocytes 12.30 0.00 - 13.40 %    Eosinophils 11.10 (H) 0.00 - 6.90 %    Basophils 1.00 0.00 - 1.80 %    Immature Granulocytes 0.20 0.00 - 0.90 %    Nucleated RBC 0.00 0.00 - 0.20 /100 WBC    Neutrophils (Absolute) 2.40 1.82 - 7.42 K/uL    Lymphs (Absolute) 1.47 1.00 - 4.80 K/uL    Monos (Absolute) 0.63 0.00 - 0.85 K/uL    Eos (Absolute) 0.57 (H) 0.00 - 0.51 K/uL    Baso (Absolute) 0.05 0.00 - 0.12 K/uL    Immature Granulocytes (abs) 0.01 0.00 - 0.11 K/uL    NRBC (Absolute) 0.00 K/uL   Comp Metabolic Panel   Result Value Ref Range    Sodium 137 135 - 145 mmol/L    Potassium 4.4 3.6 - 5.5 mmol/L    Chloride 101 96 - 112 mmol/L    Co2 24 20 - 33 mmol/L    Anion Gap 12.0 7.0 - 16.0    Glucose 89 65 - 99 mg/dL    Bun 36 (H) 8 - 22 mg/dL    Creatinine 1.71 (H) 0.50 - 1.40 mg/dL    Calcium 8.5 8.5 - 10.5 mg/dL    Correct Calcium 8.7 8.5 - 10.5 mg/dL    AST(SGOT) 20 12 - 45 U/L     ALT(SGPT) 9 2 - 50 U/L    Alkaline Phosphatase 56 30 - 99 U/L    Total Bilirubin 0.7 0.1 - 1.5 mg/dL    Albumin 3.7 3.2 - 4.9 g/dL    Total Protein 7.4 6.0 - 8.2 g/dL    Globulin 3.7 (H) 1.9 - 3.5 g/dL    A-G Ratio 1.0 g/dL   TROPONIN   Result Value Ref Range    Troponin T 81 (H) 6 - 19 ng/L   ESTIMATED GFR   Result Value Ref Range    GFR (CKD-EPI) 37 (A) >60 mL/min/1.73 m 2   TROPONIN   Result Value Ref Range    Troponin T 61 (H) 6 - 19 ng/L   EKG   Result Value Ref Range    Report       [facility] Emergency Dept.    Test Date:  2023-09-10  Pt Name:    QUINTON RIVAS    Department: ER  MRN:        [id number]                      Room:       [facility]  Gender:     Male                         Technician: [id number]  :        [id number]                   Requested By:JUAN DORADO  Order #:    [id number]                    Reading MD:    Measurements  Intervals                                Axis  Rate:       75                           P:          0  GA:         157                          QRS:        -82  QRSD:       138                          T:          92  QT:         454  QTc:        508    Interpretive Statements  Ventricular-paced complexes  No further rhythm analysis attempted due to paced rhythm  IVCD, consider atypical RBBB  Inferior infarct, old  Probable anterolateral infarct, age indeterm  Compared to ECG 2023 17:51:19  Myocardial infarct finding now present       12-lead EKG interpretation by me, rate 75 ventricular paced complexes no clear ST segment elevation no evidence of arrhythmia.  Pacemaker appears to be functional    RADIOLOGY  I have independently interpreted the diagnostic imaging associated with this visit and am waiting the final reading from the radiologist.   My preliminary interpretation is as follows: Due to intracranial process no acute cardiopulmonary abnormality  Radiologist interpretation:   DX-CHEST-PORTABLE (1 VIEW)   Final Result         1. No  acute cardiopulmonary abnormalities are identified.      CT-HEAD W/O   Final Result         1. No acute intracranial findings. No evidence of acute intracranial hemorrhage or mass lesion.      2. White matter lucencies most consistent with chronic small vessel ischemic change.                         COURSE & MEDICAL DECISION MAKING    ED Observation Status? Yes; I am placing the patient in to an observation status due to a diagnostic uncertainty as well as therapeutic intensity. Patient placed in observation status at 8:28 AM, 9/10/2023.     Observation plan is as follows: Otions IV, perform serial troponins, CT scan perform serial neurological exams    Upon Reevaluation, the patient's condition has: Improved; and will be discharged.    Patient discharged from ED Observation status at 1130 (Time) 9/10 (Date).     INITIAL ASSESSMENT, COURSE AND PLAN  Care Narrative:     This is a very pleasant 89-year-old gentleman who apparently was found down after a ground-level fall.  He never endorsed chest pain however troponin was performed for possible syncopal evaluation.  His EKG demonstrated normal functional pacemaker.  Reviewed his previous records and he just had his pacemaker interrogated has been working fine.  CT scan of the head was unremarkable.  CBC and metabolic panel was unremarkable.  Troponin was elevated and I reviewed his records.  He has a history of indeterminate troponins most recently elevated at 60.  This was repeated 2 hours later and is trending down and he never endorsed any chest pain.  Given his age, DNR status and advanced dementia aggressive evaluation for chest pain is not clinically indicated.  I feel he can be discharged home with return precautions      ADDITIONAL PROBLEM LIST    DISPOSITION AND DISCUSSIONS  I have discussed management of the patient with the following physicians and LEAH's: None    Discussion of management with other QHP or appropriate source(s): None    Escalation of care  considered, and ultimately not performed:acute inpatient care management, however at this time, the patient is most appropriate for outpatient management    Barriers to care at this time, including but not limited to: None    Decision tools and prescription drugs considered including, but not limited to: None    FINAL DIAGNOSIS  Minor head injury  History of indeterminate troponin  Functioning pacemaker       Electronically signed by: Ozzie Brooks M.D., 9/10/2023 8:39 AM

## 2023-09-10 NOTE — DISCHARGE PLANNING
PIERCE arranged for [facility] transportation back to [place].  ETA 2:30-3:00PM; Reservation # [id number]; price: $137.50

## 2023-09-10 NOTE — ED TRIAGE NOTES
Chief Complaint   Patient presents with    Fall    T-5000 Head Injury     Pt bib ems from [facility] , unwitnessed fall hitting head on hardwood floor. Per pt he was yelling for help [id number], he denies loc.   Fsbs 120  Laceration to left forehead.

## 2023-09-10 NOTE — ED NOTES
Pt assisted to stand & void with urinal. Changed back into home clothing & EMS PIV removed +sterile dressing applied. Pt remains on bed alarm, eating lunch.

## 2023-09-10 NOTE — ED NOTES
Report given to Memory Care at [facility] RNEricka.  RN to call pt's POA (brother) to see if he can transport him back home. If not, will call this RN back to see if [facility]SA is required.

## 2023-09-10 NOTE — ED NOTES
Repositioned in bed bed alarm placed. Socks applied over compression socks pt was wearing. Call light in place. Pt offered urinal

## 2023-09-10 NOTE — ED NOTES
Assumed care of pt, bedside report received from Autumn. Introduced self as pt RN, aware of POC, VSS on RA, NAD, bed alarm in place, denies needs at this time, call light in reach, will continue to monitor.

## 2023-09-10 NOTE — ED NOTES
Patient discharged home per ERP.  Discharge teaching and education discussed with patient. POC discussed with RN at assisted living facility.     PIV removed.     VSS. Pt at baseline, stable. Sent by [facility] to [facility].

## 2023-09-16 NOTE — ED PROVIDER NOTES
Emergency Physician Note    Chief Concern:  Chief Complaint   Patient presents with    T-5000 Head Injury     Patient BIB EMS from [place] at [facility] for 3rd fall this week. + head strike on table. Patients fall was unwitnessed. L hip pain. - Thinners         Limitation to History:  Select: Baseline history of dementia    HPI/ROS   Outside Historians:   EMS  Transporting paramedics     External Records Reviewed:  Inpatient Notes, Mr. Rios was admitted to this facility last month.  Hospital medicine physician discharge summary reviewed from 8/3/2023.  Noted he has a past medical history significant for dementia, residing in a memory care unit.  He was evaluated after a fall, found to be febrile to 101.5, on further evaluation he was found to have a left-sided pneumonia and lactic acidosis.  He was treated with antibiotics and fluid resuscitated.  He clinically improved, and was discharged in fair and stable condition.    HPI:  Abhilash Rios is a 89 y.o. male who presents to the emergency department as a code TBI activation after a fall from standing.  He is currently living in a memory care facility, when transferring to his walker he fell striking the left side of his head.  This was an unwitnessed fall, staff found him on the ground and think he had fallen within the past 5 minutes.  He does not report losing consciousness, however paramedics state that he does have a history of dementia, per nursing facility staff they reported that he does appear to be at his neurologic baseline.  He is not currently on any anticoagulation as reported by paramedics.  On arrival he has no pain, he does have a small 1 cm laceration to the left parietal scalp that is hemostatic on arrival.  He has no facial abrasions.  He does not report any neck pain, reports no extremity pain in the trauma bay, however paramedics state that he did report some left-sided hip pain.  Paramedics report that he was able to bear full  "weight, seem to be unsteady on his feet when they stood him up, but did not seem to have any difficulty with weightbearing on either extremity.  Further HPI is limited by patient's baseline dementia.    PAST MEDICAL HISTORY  Past Medical History:   Diagnosis Date    Atrial flutter (HCC) 3/8/2012    Dizziness 3/8/2012    Macular degeneration 3/8/2012    Pacemaker     Pre-syncope 3/8/2012    Presence of permanent cardiac pacemaker 3/8/2012    SSS (sick sinus syndrome) (HCC) 3/8/2012    TIA (transient ischemic attack) 3/8/2012    Vertigo 3/8/2012       SURGICAL HISTORY  Past Surgical History:   Procedure Laterality Date    EYE SURGERY      09/02/09 Status post eye surgery for macular degeneration.    OTHER ORTHOPEDIC SURGERY      Lower Back Surgery    ROTATOR CUFF REPAIR      09/02/09 Bilateral.    TRANS URETHRAL RESECTION PROSTATE         FAMILY HISTORY  Family History   Problem Relation Age of Onset    Non-contributory Other        SOCIAL HISTORY   reports that he has never smoked. He has never used smokeless tobacco. He reports that he does not drink alcohol and does not use drugs.    CURRENT MEDICATIONS  Previous Medications    ACETAMINOPHEN (TYLENOL) 325 MG TAB    Take 325 mg by mouth every 6 hours as needed for Mild Pain or Fever. Indications: Fever, Pain    AMOXICILLIN-CLAVULANATE (AUGMENTIN) 875-125 MG TAB    Take 1 Tablet by mouth 2 times a day.    FUROSEMIDE (LASIX) 20 MG TAB    Take 1 Tablet by mouth every 48 hours.    SODIUM HYPOCHLORITE (DAKINS SOLUTION) 0.5 % SOLUTION    Apply 1 Application topically every day.    SPIRONOLACTONE (ALDACTONE) 25 MG TAB    Take 1 Tablet by mouth every 48 hours.    ZINC OXIDE 22 % CREAM    Apply 1 Application topically 1 time a day as needed (to bottom).       ALLERGIES  Sulfa drugs and Sulfasalazine    PHYSICAL EXAM  Vital Signs: /73   Pulse 69   Temp 36.5 °C (97.7 °F) (Temporal)   Resp 17   Ht 1.676 m (5' 6\")   Wt 65.8 kg (145 lb)   SpO2 100%   BMI 23.40 kg/m² "   Constitutional: Alert, no acute distress  HEENT: 1 cm full-thickness laceration to the left parietal scalp involving epidermal layer, dermal layer, and a small amount of subcutaneous tissue.  Wound is hemostatic, shape is irregular.  Otherwise no abrasions, no lacerations, no ecchymosis.  Cardiovascular: Regular rhythm, no murmur appreciated though exam somewhat limited by ambient noise.  Pulmonary: Breath sounds clear and equal bilaterally  Abdomen: Soft, nondistended, nontender to palpation  Skin: Warm, dry, no rashes or lesions  Musculoskeletal: Chest wall is nontender to palpation, pacemaker or AICD present.  Bilateral hips and pelvis are nontender to palpation, he is able to fully range the shoulders, elbows, and wrist without significant pain.  Neurologic: Awake and alert, answers questions, though does not recall recent events nor medical history consistent with reported history of dementia.  Psychiatric: Normal and appropriate mood and affect    Diagnostic Studies & Procedures    Laceration Repair Procedure Note    Indication: Laceration    Procedure: The patient was placed in the appropriate position. The wound was minimally contaminated .The area was then copiously irrigated with normal saline. The laceration was a 1 cm irregular laceration involving the epidermal layer and dermal layer as well as subcutaneous tissue, no galea involvement. The laceration was closed with staples. There were no additional lacerations requiring repair. The wound area was then dressed with a sterile dressing.  Tetanus was updated.     Total repaired wound length: 1 cm.     Other Items: Staple count: 1    The patient tolerated the procedure well.    Complications: None     Labs:  All labs reviewed by me as noted below.    Radiology:  The attending Emergency Physician has independently interpreted the following imaging:  I independently interpreted the CT of the head, no intracranial bleed identified.    DX-HIP-UNILATERAL-WITH  PELVIS-1 VIEW LEFT   Final Result      1.  No radiographic evidence of acute traumatic injury.      2.  Moderate osteoarthritic changes of the left hip.      3.  Osteopenia.      CT-CSPINE WITHOUT PLUS RECONS   Final Result      1.  Mild cervical kyphosis unchanged.      2.  Osteopenia.      3.  Severe osteoarthritic degenerative changes from the C3-4 level through the C6-7 level with mild to moderate marginal osteophytosis.      4.  No evidence of acute cervical spine fracture and/or subluxation.      CT-HEAD W/O   Final Result         NO ACUTE ABNORMALITIES ARE NOTED ON CT SCAN OF THE HEAD.      Findings are consistent with atrophy.  Decreased attenuation in the periventricular white matter likely indicates microvascular ischemic disease.             Course and Medical Decision Making    ED Observation Status: No    Initial Assessment and Plan    Mr. Adhikari presents to the emergency department today as a code TBI activation.  On arrival he is awake and alert, he does have a small laceration to the left parietal scalp.  Paramedics report no loss of consciousness, and he has not shown any signs of decline during transport.    On laboratory evaluation he has a normal white blood count.  Hemoglobin is 10.8, which is consistent with his prior baseline values.  Platelet count is within normal limits.  CMP is notable for creatinine of 1.63 which is actually improved from prior values, and is consistent with his baseline creatinine.  Calcium is low at 7.9, which has also been present previously.  No acute concerns identified.    Plain film of the right hip demonstrates no acute traumatic injury.  As he is able to bear full weight as reported by paramedics, do not believe advanced imaging of the hip is necessary.    CT of the cervical spine is negative for acute fracture or dislocation.  CT head is negative for acute traumatic intracranial injury.    On my reassessment, he remains well-appearing without any new or  worsening symptoms.  He has not shown any signs of clinical deterioration.  He does have some chronic abnormalities on CMP, though nothing acute that would cause him to fall.  Sodium is within normal limits and unchanged from prior.  Laceration was closed according to above procedure note.  At this time, I do believe he can safely be discharged home to his memory care facility.  Return precautions were discussed with the patient, and provided in written form with the patient's discharge instructions.       Additional Problems and Disposition    Escalation of care considered, and ultimately not performed: acute inpatient care management, however at this time, the patient is most appropriate for outpatient management.  Trauma service consultation and hospitalization was initially considered on his arrival, however CT imaging and lab work are reassuring, no indication for inpatient treatment at this time, he did not have any new or worsening symptoms nor signs of deterioration in the emergency department.    Disposition:  The patient will return for new or worsening symptoms and is stable at the time of discharge.    Patient will be discharged home in stable condition.    FOLLOW UP:  James Avila M.D.  [address] [facility] [address]  [phone]    Schedule an appointment as soon as possible for a visit       [facility], Emergency Dept  [address] [facility] [id number] [phone]  Go to   If symptoms worsen      FINAL IMPRESSION   1. Closed head injury, initial encounter    2. Fall, initial encounter      Toney PAT (Jaquelin), am scribing for, and in the presence of, Jeanne Amador M.D..    Electronically signed by: Toney Hardy (Jaquelin), 9/16/2023    Jeanne PAT M.D. personally performed the services described in this documentation, as scribed by Toney Hardy in my presence, and it is both accurate and complete.     The note accurately reflects work and  decisions made by me.  Jeanne Amador M.D.  9/16/2023  9:57 PM

## 2023-09-16 NOTE — DISCHARGE INSTRUCTIONS
Please follow-up with your primary care clinic for staple removal and wound recheck within 3 to 5 days.  Return to the emergency department if you develop any new or worsening symptoms including headache, nausea, vomiting, or confusion.    Please follow-up with your primary care physician in 2-3 days for wound recheck.  You may take Tylenol or ibuprofen as needed for pain.  After 24 hours, you may wash the wound normally.  Do not soak the area until sutures are removed.  Please follow-up with your primary care physician, urgent care, or this emergency department in 3-5 days for staple removal and wound recheck.  Return to the emergency department immediately if you develop redness or swelling around the wound, pain in the area of the wound, streaking from the wound, drainage from the wound, fevers, or if you develop any other new or worsening symptoms.

## 2023-09-16 NOTE — ED TRIAGE NOTES
Chief Complaint   Patient presents with    T-5000 Head Injury     Patient BIB EMS from [place] at [facility] for 3rd fall this week. + head strike on table. Patients fall was unwitnessed. L hip pain. - Thinners

## 2023-09-16 NOTE — ED NOTES
Patient resting on gurney. Social work contacted for assistance with arranging EMS transport back to care facility.

## 2023-09-17 NOTE — ED NOTES
Patient resting on gurney. Social work alerted nurse transport has been arranged and will be here around 2030.

## 2023-09-17 NOTE — ED NOTES
Patient transferred to wheelchair with standby assist. Patient leaving with all belongings with [facility].

## 2023-09-17 NOTE — DISCHARGE PLANNING
PIERCE asked to assist with transportation of Pt back to [facility].  PIERCE called [facility] and updated Donya ([phone]) that the Pt has been medically cleared and ready for discharge.  No transportation available.  PIERCE called and arranged [facility] trip set for [id number].  ER RN updated.

## 2023-09-18 NOTE — ED NOTES
Cm contacted to assist with  return to [place].  Belinda at same of pending return. ([phone])

## 2023-09-18 NOTE — ED NOTES
Labs drawn upon return from ct. Ekg in progress, pt has pacer left shoulder, placed on cardiac moniter

## 2023-09-18 NOTE — ED TRIAGE NOTES
"PT TO ER VIA [facility] FROM [facility]  for glf with hematoma and abrasion to left temple area. Staff reports 3 falls this week. Pt orientation baseline x1-2. Currently  oriented to person, staff denies loc. C/o \"butt pain\".  "

## 2023-09-18 NOTE — DISCHARGE INSTRUCTIONS
You are seen emerged part for a fall with evidence of injury to the head.  Thankfully your work-up was reassuring.  There is no signs of significant injury from this fall.  Please take extreme caution given your history of falls to prevent further ones.

## 2023-09-18 NOTE — ED NOTES
Amb to br for void. Head wound cleaned with warm soapy water with antibx ointment to left temple area and bandaid. Amb to br with walker with this rn as sba for void. Water provided upon return with rn supervision for adequate swallow-noted at 60 degree head elevation

## 2023-09-18 NOTE — ED NOTES
Results noted. Per erp, staff to attempt pt ambulation. Pe r previous encounters, pt appears to amb with walker

## 2023-09-18 NOTE — DISCHARGE PLANNING
Anticipated Discharge Disposition: Home    Action:   Spoke [facility] Brother to pt. He is not able to transport pt. He lives at [place] Spoke with Bang at [facility]. Reviewed findings. Approval for [facility] to transport via wheelchair obtained Bernadine DUENAS ETA 1-1:30 Confirmation [id number] Cost 132.87.    Barriers to Discharge: ETA 1:00 -1:30    Plan:  no further needs. Please give [facility] transport a facesheet

## 2023-09-18 NOTE — ED PROVIDER NOTES
ED Provider Note        CHIEF COMPLAINT  Chief Complaint   Patient presents with    Fall     Glf-3rd this week    Closed Head Injury     - loc         HPI  LIMITATION TO HISTORY   Select: Altered mental status / Confusion history of dementia  OUTSIDE HISTORIAN(S):  EMS      Abhilash Rios is a 89 y.o. male who presents to the Emergency Department via EMS for a fall.  The patient reports that he took a fall earlier striking his head.  He denies any loss of conscious, neck pain, chest pain, shortness of breath, abdominal pain.  He reports that he has had multiple recent falls.    EMS reports the patient is at his mental baseline per the facility.    REVIEW OF SYSTEMS  See HPI for further details. All other systems are negative.     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    OTHER ORTHOPEDIC SURGERY      Lower Back Surgery    ROTATOR CUFF REPAIR      09/02/09 Bilateral.    TRANS URETHRAL RESECTION PROSTATE         FAMILY HISTORY  Family History   Problem Relation Age of Onset    Non-contributory Other        SOCIAL HISTORY    reports that he has never smoked. He has never used smokeless tobacco. He reports that he does not drink alcohol and does not use drugs.    CURRENT MEDICATIONS  Home Medications       Reviewed by Sandi Crawley (Pharmacy Tech) on 09/18/23 at 0948  Med List Status: Complete     Medication Last Dose Status   acetaminophen (TYLENOL) 325 MG Tab PRN Active   ciprofloxacin (CIPRO) 500 MG Tab 9/10/2023 Active   furosemide (LASIX) 20 MG  Tab 2023 Active   gentamicin (GARAMYCIN) 0.1 % cream 2023 Active   spironolactone (ALDACTONE) 25 MG Tab 2023 Active   zinc oxide oint (ZINC OXIDE OINTMENT) 20 % Ointment 2023 Active                    ALLERGIES  Allergies   Allergen Reactions    Sulfa Drugs Anaphylaxis    Sulfasalazine Anaphylaxis       PHYSICAL EXAM  VITAL SIGNS: /77   Pulse 68   Temp 36.9 °C (98.5 °F) (Temporal)   Resp 18   SpO2 98%   Gen: Alert, oriented  HENT: No racoon eyes septal hematoma, facial instability.  Left frontal hematoma with abrasion.  Prior scalp staple in place from scalp laceration.    Eye: EOMI, no chemosis, PERRL  Neck: trachea midline, no tenderness  Resp: no respiratory distress, no chest wall tenderness or crepitus.  Occasional rattles.  Intermittent crackles left chest  CV: No JVD, RRR.  + peripheral pulses  Abd: soft, non-distended, non-tender. No ecchymosis  Back: no spinal tenderness or deformities  Ext: No deformities, tenderness or edema. Ecchymosis to right arm.  Psych: normal mood  Neuro: speech fluent, moves all extremities. GCS 14    DIAGNOSTIC STUDIES / PROCEDURES  EKG  Results for orders placed or performed during the hospital encounter of 23   EKG (NOW)   Result Value Ref Range    Report       [facility] Emergency Dept.    Test Date:  2023  Pt Name:    QUINTON EVANSJESSISUDARSHAN    Department: [facility]  MRN:        [id number]                      Room:       -ROOM 3  Gender:     Male                         Technician: jeri  :        [id number]                   Requested By:AMBROSIO HOOKS  Order #:    [id number]                    Reading MD: Ambrosio Hooks    Measurements  Intervals                                Axis  Rate:       70                           P:          0  AR:         0                            QRS:        -82  QRSD:       138                          T:          91  QT:         460  QTc:        497    Interpretive  Statements  Ventricular paced rhythm  Inferior infarct, old  Compared to ECG 09/10/2023 08:50:58  Myocardial infarct finding still present  Electronically Signed On 09- 10:10:54 PDT by Ambrosio Hooks         LABS  Labs Reviewed   CBC WITH DIFFERENTIAL - Abnormal; Notable for the following components:       Result Value    RBC 4.05 (*)     Hemoglobin 11.3 (*)     Hematocrit 35.4 (*)     MCHC 31.9 (*)     RDW 50.9 (*)     Eosinophils 8.80 (*)     All other components within normal limits   COMP METABOLIC PANEL - Abnormal; Notable for the following components:    Bun 28 (*)     Globulin 4.1 (*)     All other components within normal limits   ESTIMATED GFR - Abnormal; Notable for the following components:    GFR (CKD-EPI) 49 (*)     All other components within normal limits         RADIOLOGY  I have independently interpreted the diagnostic imaging associated with this visit:  CT head: No intracranial bleed    DX-CHEST-PORTABLE (1 VIEW)   Final Result      Cardiomegaly.      CT-CSPINE WITHOUT PLUS RECONS   Final Result      1.  No evidence of cervical spine fracture.      2.  Multilevel degenerative disc disease and facet degeneration.      CT-HEAD W/O   Final Result      1.  No evidence of acute intracranial process.      2.  Cerebral atrophy as well as periventricular chronic small vessel ischemic change.             COURSE & MEDICAL DECISION MAKING  Pertinent Labs & Imaging studies were reviewed. (See chart for details)    ED Observation Status? Yes  Patient admitted to ED observation at 8:49 AM on 9/18/2023    Observation plan: Follow-up EKG, imaging, labs, determination of disposition    After observation the patient is improved and will be discharged  Patient discharged from ED Observation at 11:25 AM on 9/18/2023    EXTERNAL RECORDS REVIEWED  Inpatient Notes admitted 8/1/2023 for fall, sided pneumonia  Patient underwent scalp stapling 9/16/2023 for fall    INITIAL ASSESSMENT AND PLAN  Care Narrative: Patient  arrives after a fall.  He has been seen multiple times for falls recently.  Does have a history of dementia, complicating history.  Given the evidence of trauma to the head, CT scan of the head and cervical spine were obtained, which were reassuring.  No new wounds requiring closure.  No leukocytosis.  No stigmata of DVT/PE.  Low suspicion for ACS.    No significant electrolyte abnormalities.  EKG similar to prior, negative Sgarbossa criteria.  Will ambulate patient and plan for transfer back to his memory care facility.    Is able to ambulate independently with walker    ADDITIONAL PROBLEM LIST AND DISPOSITION    Escalation of care considered, and ultimately not performed: acute inpatient care management, however at this time, the patient is most appropriate for outpatient management.       Patient is referred to primary care provider for blood pressure, diabetes and all other preventative health services.  Patient was given return precautions, anticipatory guidance, and the opportunity ask questions prior to discharge        FINAL IMPRESSION  1. Fall, initial encounter    2. Hematoma of frontal scalp, initial encounter           DISPOSITION:  Patient will be discharged home in stable condition.    FOLLOW UP:  James Avila M.D.  [address]  [place] [address]  [phone]    Schedule an appointment as soon as possible for a visit       [facility], Emergency Dept  [address]  [place] [address] [phone]    If symptoms worsen      This dictation was created using voice recognition software. The accuracy of the dictation is limited to the abilities of the software. I expect there may be some errors of grammar and possibly content. The nursing notes were reviewed and certain aspects of this information were incorporated into this note.

## 2023-09-18 NOTE — ED NOTES
Medication history reviewed with MAR from [facility] ([phone]). Med rec is complete.  Allergies reviewed, per MAR from [facility]     Called [facility] [phone] to verify if the date is circled they have not given that medication.     Patient has had outpatient antibiotics in the last 30 days.  Per MAR shows that pt was on CIPROFLOXACIN 500MG on 8/29/2023 until 9/10/2023 @ 1700.    Pt is not on any anticoagulants, per MAR from [facility]

## 2023-09-18 NOTE — ED NOTES
Warm meal provided. Gmt here forpt . Given permission for pt to eat prior to transfer. Karin at [facility] aware of pending pt departure